# Patient Record
Sex: FEMALE | Race: WHITE | NOT HISPANIC OR LATINO | URBAN - METROPOLITAN AREA
[De-identification: names, ages, dates, MRNs, and addresses within clinical notes are randomized per-mention and may not be internally consistent; named-entity substitution may affect disease eponyms.]

---

## 2017-12-18 ENCOUNTER — OUTPATIENT (OUTPATIENT)
Dept: OUTPATIENT SERVICES | Facility: HOSPITAL | Age: 77
LOS: 1 days | Discharge: HOME | End: 2017-12-18
Payer: MEDICARE

## 2017-12-18 VITALS
RESPIRATION RATE: 16 BRPM | WEIGHT: 158.51 LBS | OXYGEN SATURATION: 96 % | TEMPERATURE: 98 F | SYSTOLIC BLOOD PRESSURE: 156 MMHG | HEART RATE: 91 BPM | DIASTOLIC BLOOD PRESSURE: 80 MMHG | HEIGHT: 58 IN

## 2017-12-18 DIAGNOSIS — Z01.818 ENCOUNTER FOR OTHER PREPROCEDURAL EXAMINATION: ICD-10-CM

## 2017-12-18 DIAGNOSIS — R94.31 ABNORMAL ELECTROCARDIOGRAM [ECG] [EKG]: ICD-10-CM

## 2017-12-18 DIAGNOSIS — Z90.89 ACQUIRED ABSENCE OF OTHER ORGANS: Chronic | ICD-10-CM

## 2017-12-18 DIAGNOSIS — Z90.11 ACQUIRED ABSENCE OF RIGHT BREAST AND NIPPLE: Chronic | ICD-10-CM

## 2017-12-18 DIAGNOSIS — M19.012 PRIMARY OSTEOARTHRITIS, LEFT SHOULDER: ICD-10-CM

## 2017-12-18 DIAGNOSIS — Z90.49 ACQUIRED ABSENCE OF OTHER SPECIFIED PARTS OF DIGESTIVE TRACT: Chronic | ICD-10-CM

## 2017-12-18 DIAGNOSIS — I10 ESSENTIAL (PRIMARY) HYPERTENSION: ICD-10-CM

## 2017-12-18 LAB
ANION GAP SERPL CALC-SCNC: 7 MMOL/L — SIGNIFICANT CHANGE UP (ref 5–17)
APTT BLD: 34.2 SEC — SIGNIFICANT CHANGE UP (ref 27.5–37.4)
BASOPHILS # BLD AUTO: 0.1 K/UL — SIGNIFICANT CHANGE UP (ref 0–0.2)
BASOPHILS NFR BLD AUTO: 1.3 % — SIGNIFICANT CHANGE UP (ref 0–2)
BUN SERPL-MCNC: 26 MG/DL — HIGH (ref 7–23)
CALCIUM SERPL-MCNC: 8.9 MG/DL — SIGNIFICANT CHANGE UP (ref 8.5–10.1)
CHLORIDE SERPL-SCNC: 105 MMOL/L — SIGNIFICANT CHANGE UP (ref 96–108)
CO2 SERPL-SCNC: 30 MMOL/L — SIGNIFICANT CHANGE UP (ref 22–31)
CREAT SERPL-MCNC: 0.79 MG/DL — SIGNIFICANT CHANGE UP (ref 0.5–1.3)
EOSINOPHIL # BLD AUTO: 0.1 K/UL — SIGNIFICANT CHANGE UP (ref 0–0.5)
EOSINOPHIL NFR BLD AUTO: 1.6 % — SIGNIFICANT CHANGE UP (ref 0–6)
GLUCOSE SERPL-MCNC: 92 MG/DL — SIGNIFICANT CHANGE UP (ref 70–99)
HBA1C BLD-MCNC: 5.8 % — HIGH (ref 4–5.6)
HCT VFR BLD CALC: 36.5 % — SIGNIFICANT CHANGE UP (ref 34.5–45)
HGB BLD-MCNC: 12.3 G/DL — SIGNIFICANT CHANGE UP (ref 11.5–15.5)
INR BLD: 1.15 RATIO — SIGNIFICANT CHANGE UP (ref 0.88–1.16)
LYMPHOCYTES # BLD AUTO: 2.2 K/UL — SIGNIFICANT CHANGE UP (ref 1–3.3)
LYMPHOCYTES # BLD AUTO: 27.6 % — SIGNIFICANT CHANGE UP (ref 13–44)
MCHC RBC-ENTMCNC: 31 PG — SIGNIFICANT CHANGE UP (ref 27–34)
MCHC RBC-ENTMCNC: 33.6 GM/DL — SIGNIFICANT CHANGE UP (ref 32–36)
MCV RBC AUTO: 92.3 FL — SIGNIFICANT CHANGE UP (ref 80–100)
MONOCYTES # BLD AUTO: 0.5 K/UL — SIGNIFICANT CHANGE UP (ref 0–0.9)
MONOCYTES NFR BLD AUTO: 6.4 % — SIGNIFICANT CHANGE UP (ref 2–14)
MRSA PCR RESULT.: SIGNIFICANT CHANGE UP
NEUTROPHILS # BLD AUTO: 4.9 K/UL — SIGNIFICANT CHANGE UP (ref 1.8–7.4)
NEUTROPHILS NFR BLD AUTO: 63.1 % — SIGNIFICANT CHANGE UP (ref 43–77)
PLATELET # BLD AUTO: 274 K/UL — SIGNIFICANT CHANGE UP (ref 150–400)
POTASSIUM SERPL-MCNC: 3.8 MMOL/L — SIGNIFICANT CHANGE UP (ref 3.5–5.3)
POTASSIUM SERPL-SCNC: 3.8 MMOL/L — SIGNIFICANT CHANGE UP (ref 3.5–5.3)
PROTHROM AB SERPL-ACNC: 12.6 SEC — SIGNIFICANT CHANGE UP (ref 9.8–12.7)
RBC # BLD: 3.96 M/UL — SIGNIFICANT CHANGE UP (ref 3.8–5.2)
RBC # FLD: 11.7 % — SIGNIFICANT CHANGE UP (ref 11–15)
S AUREUS DNA NOSE QL NAA+PROBE: SIGNIFICANT CHANGE UP
SODIUM SERPL-SCNC: 142 MMOL/L — SIGNIFICANT CHANGE UP (ref 135–145)
WBC # BLD: 7.8 K/UL — SIGNIFICANT CHANGE UP (ref 3.8–10.5)
WBC # FLD AUTO: 7.8 K/UL — SIGNIFICANT CHANGE UP (ref 3.8–10.5)

## 2017-12-18 PROCEDURE — 93010 ELECTROCARDIOGRAM REPORT: CPT | Mod: NC

## 2017-12-18 NOTE — H&P PST ADULT - NEGATIVE MUSCULOSKELETAL SYMPTOMS
no joint swelling/no myalgia/no back pain/no leg pain L/no muscle cramps/no stiffness/no arm pain R/no muscle weakness/no neck pain/no leg pain R

## 2017-12-18 NOTE — H&P PST ADULT - LAB RESULTS AND INTERPRETATION
CBC with diff, BMP, HGBA1C, T&S, PT/PTT ordered CBC with diff, BMP, HGBA1C, T&S, PT/PTT, MRSA nose culture ordered

## 2017-12-18 NOTE — H&P PST ADULT - NSANTHSNORERD_ENT_A_CORE
Bartolo Rincon is a 47year old female. CC:  Patient presents with:  Physical: annual physical      HPI:  Physical     Urinary frequency. No dysuria. Has some abdominal pain - no fevers.   Denies foul urine odor - states urine at time is \"white,\" any unusual skin lesions or rashes  EYES: no visual complaints or deficits  HEENT: denies nasal congestion, sinus pain or sore throat   RESPIRATORY: denies shortness of breath, wheezing or cough   CARDIOVASCULAR: denies chest pain,  edema, orthopnea, no pa metabolic and immunity disorder  - Comp Metabolic Panel (14) [E]; Future  - Lipid Panel [E]; Future  - Vitamin D, 25-Hydroxy [E]; Future  - TSH W Reflex To Free T4 [E]; Future  - CBC, Platelet, No Differential [E]; Future    3.  Screening for breast cancer No

## 2017-12-18 NOTE — H&P PST ADULT - CONSTITUTIONAL DETAILS
well-developed/well-groomed/well-nourished/obese/no distress well-developed/no distress/well-nourished/well-groomed

## 2017-12-18 NOTE — H&P PST ADULT - HISTORY OF PRESENT ILLNESS
76yo female with medical h/o HTN and prior h/o Breast cancer, right and had Lumpectomy 2/2013 and radiation. Pt c/o pain to left shoulder x 1 year, denies trauma. pt presents today for presurgical evaluation for Left Total Shoulder Arthroplasty with Augmented Glenoid scheduled for 12/28/2017.

## 2017-12-18 NOTE — H&P PST ADULT - MUSCULOSKELETAL
detailed exam no joint warmth/no joint erythema/no joint swelling/decreased ROM due to pain/normal strength/no calf tenderness details…

## 2017-12-18 NOTE — H&P PST ADULT - NEGATIVE BREAST SYMPTOMS
no breast tenderness R/no nipple discharge L/no breast tenderness L/no breast lump R/no nipple discharge R/no breast lump L

## 2017-12-18 NOTE — H&P PST ADULT - LYMPHATIC
posterior cervical R/supraclavicular R/anterior cervical R/posterior cervical L/supraclavicular L/anterior cervical L

## 2017-12-18 NOTE — H&P PST ADULT - PROBLEM SELECTOR PLAN 1
Left Total Shoulder Arthroplasty with Augmented Glenoid scheduled for 12/28/2017. Left Total Shoulder Arthroplasty with Augmented Glenoid scheduled for 12/28/2017.  Pre-op instructions given by RN. Pt verbalized understanding.  Chlorhexidine wash instructions given.  Medical clearance/evaluation requested - including copy of Stress/Echo if available.

## 2017-12-18 NOTE — H&P PST ADULT - FAMILY HISTORY
Sibling  Still living? No  Diabetes mellitus, Age at diagnosis: Age Unknown  Family history of breast cancer in sister, Age at diagnosis: Age Unknown

## 2017-12-18 NOTE — ASU PATIENT PROFILE, ADULT - PSH
History of lumpectomy of right breast  2003 - radiation also  S/P appendectomy  At Childhood  S/P laparoscopic cholecystectomy    S/P tonsillectomy  At Childhood

## 2017-12-18 NOTE — H&P PST ADULT - PROBLEM SELECTOR PLAN 3
Comparison EKG requested, spoke to Jennifer from PCP's office - no comparison EKG available. Recent Echo & Carotid doppler received.  Medical evaluation/clearance requested - pt has appt with PCP.

## 2017-12-18 NOTE — H&P PST ADULT - NSANTHOSAYNRD_GEN_A_CORE
No. ZAHRAA screening performed.  STOP BANG Legend: 0-2 = LOW Risk; 3-4 = INTERMEDIATE Risk; 5-8 = HIGH Risk

## 2017-12-18 NOTE — H&P PST ADULT - NEGATIVE CARDIOVASCULAR SYMPTOMS
no paroxysmal nocturnal dyspnea/no orthopnea/no peripheral edema/no chest pain/no dyspnea on exertion/no claudication/no palpitations

## 2017-12-27 ENCOUNTER — TRANSCRIPTION ENCOUNTER (OUTPATIENT)
Age: 77
End: 2017-12-27

## 2017-12-28 ENCOUNTER — TRANSCRIPTION ENCOUNTER (OUTPATIENT)
Age: 77
End: 2017-12-28

## 2017-12-28 ENCOUNTER — INPATIENT (INPATIENT)
Facility: HOSPITAL | Age: 77
LOS: 0 days | Discharge: HOME HEALTH | End: 2017-12-29
Attending: ORTHOPAEDIC SURGERY | Admitting: ORTHOPAEDIC SURGERY
Payer: MEDICARE

## 2017-12-28 ENCOUNTER — RESULT REVIEW (OUTPATIENT)
Age: 77
End: 2017-12-28

## 2017-12-28 VITALS
HEIGHT: 55 IN | TEMPERATURE: 97 F | OXYGEN SATURATION: 98 % | RESPIRATION RATE: 16 BRPM | DIASTOLIC BLOOD PRESSURE: 82 MMHG | SYSTOLIC BLOOD PRESSURE: 178 MMHG | WEIGHT: 156.97 LBS | HEART RATE: 91 BPM

## 2017-12-28 DIAGNOSIS — M19.012 PRIMARY OSTEOARTHRITIS, LEFT SHOULDER: ICD-10-CM

## 2017-12-28 DIAGNOSIS — I10 ESSENTIAL (PRIMARY) HYPERTENSION: ICD-10-CM

## 2017-12-28 DIAGNOSIS — Z90.49 ACQUIRED ABSENCE OF OTHER SPECIFIED PARTS OF DIGESTIVE TRACT: Chronic | ICD-10-CM

## 2017-12-28 DIAGNOSIS — Z90.11 ACQUIRED ABSENCE OF RIGHT BREAST AND NIPPLE: Chronic | ICD-10-CM

## 2017-12-28 DIAGNOSIS — Z90.89 ACQUIRED ABSENCE OF OTHER ORGANS: Chronic | ICD-10-CM

## 2017-12-28 DIAGNOSIS — C50.919 MALIGNANT NEOPLASM OF UNSPECIFIED SITE OF UNSPECIFIED FEMALE BREAST: ICD-10-CM

## 2017-12-28 LAB
ANION GAP SERPL CALC-SCNC: 9 MMOL/L — SIGNIFICANT CHANGE UP (ref 5–17)
BASOPHILS # BLD AUTO: 0.1 K/UL — SIGNIFICANT CHANGE UP (ref 0–0.2)
BASOPHILS NFR BLD AUTO: 0.7 % — SIGNIFICANT CHANGE UP (ref 0–2)
BUN SERPL-MCNC: 20 MG/DL — SIGNIFICANT CHANGE UP (ref 7–23)
CALCIUM SERPL-MCNC: 8.2 MG/DL — LOW (ref 8.5–10.1)
CHLORIDE SERPL-SCNC: 108 MMOL/L — SIGNIFICANT CHANGE UP (ref 96–108)
CO2 SERPL-SCNC: 24 MMOL/L — SIGNIFICANT CHANGE UP (ref 22–31)
CREAT SERPL-MCNC: 0.8 MG/DL — SIGNIFICANT CHANGE UP (ref 0.5–1.3)
EOSINOPHIL # BLD AUTO: 0 K/UL — SIGNIFICANT CHANGE UP (ref 0–0.5)
EOSINOPHIL NFR BLD AUTO: 0 % — SIGNIFICANT CHANGE UP (ref 0–6)
GLUCOSE SERPL-MCNC: 143 MG/DL — HIGH (ref 70–99)
HCT VFR BLD CALC: 34.3 % — LOW (ref 34.5–45)
HGB BLD-MCNC: 11.8 G/DL — SIGNIFICANT CHANGE UP (ref 11.5–15.5)
LYMPHOCYTES # BLD AUTO: 0.9 K/UL — LOW (ref 1–3.3)
LYMPHOCYTES # BLD AUTO: 5.8 % — LOW (ref 13–44)
MCHC RBC-ENTMCNC: 31.8 PG — SIGNIFICANT CHANGE UP (ref 27–34)
MCHC RBC-ENTMCNC: 34.3 GM/DL — SIGNIFICANT CHANGE UP (ref 32–36)
MCV RBC AUTO: 92.8 FL — SIGNIFICANT CHANGE UP (ref 80–100)
MONOCYTES # BLD AUTO: 0.4 K/UL — SIGNIFICANT CHANGE UP (ref 0–0.9)
MONOCYTES NFR BLD AUTO: 2.9 % — SIGNIFICANT CHANGE UP (ref 2–14)
NEUTROPHILS # BLD AUTO: 13.7 K/UL — HIGH (ref 1.8–7.4)
NEUTROPHILS NFR BLD AUTO: 90.7 % — HIGH (ref 43–77)
PLATELET # BLD AUTO: 271 K/UL — SIGNIFICANT CHANGE UP (ref 150–400)
POTASSIUM SERPL-MCNC: 4.3 MMOL/L — SIGNIFICANT CHANGE UP (ref 3.5–5.3)
POTASSIUM SERPL-SCNC: 4.3 MMOL/L — SIGNIFICANT CHANGE UP (ref 3.5–5.3)
RBC # BLD: 3.69 M/UL — LOW (ref 3.8–5.2)
RBC # FLD: 11.8 % — SIGNIFICANT CHANGE UP (ref 11–15)
SODIUM SERPL-SCNC: 141 MMOL/L — SIGNIFICANT CHANGE UP (ref 135–145)
WBC # BLD: 15.1 K/UL — HIGH (ref 3.8–10.5)
WBC # FLD AUTO: 15.1 K/UL — HIGH (ref 3.8–10.5)

## 2017-12-28 PROCEDURE — 88309 TISSUE EXAM BY PATHOLOGIST: CPT | Mod: 26

## 2017-12-28 PROCEDURE — 99233 SBSQ HOSP IP/OBS HIGH 50: CPT

## 2017-12-28 PROCEDURE — 73030 X-RAY EXAM OF SHOULDER: CPT | Mod: 26,LT

## 2017-12-28 PROCEDURE — 88311 DECALCIFY TISSUE: CPT | Mod: 26

## 2017-12-28 PROCEDURE — 88305 TISSUE EXAM BY PATHOLOGIST: CPT | Mod: 26

## 2017-12-28 RX ORDER — ACETAMINOPHEN 500 MG
1000 TABLET ORAL ONCE
Qty: 0 | Refills: 0 | Status: COMPLETED | OUTPATIENT
Start: 2017-12-28 | End: 2017-12-28

## 2017-12-28 RX ORDER — OXYCODONE HYDROCHLORIDE 5 MG/1
5 TABLET ORAL EVERY 4 HOURS
Qty: 0 | Refills: 0 | Status: DISCONTINUED | OUTPATIENT
Start: 2017-12-28 | End: 2017-12-29

## 2017-12-28 RX ORDER — SODIUM CHLORIDE 9 MG/ML
1000 INJECTION INTRAMUSCULAR; INTRAVENOUS; SUBCUTANEOUS
Qty: 0 | Refills: 0 | Status: DISCONTINUED | OUTPATIENT
Start: 2017-12-28 | End: 2017-12-29

## 2017-12-28 RX ORDER — CEFAZOLIN SODIUM 1 G
2000 VIAL (EA) INJECTION EVERY 6 HOURS
Qty: 0 | Refills: 0 | Status: COMPLETED | OUTPATIENT
Start: 2017-12-28 | End: 2017-12-29

## 2017-12-28 RX ORDER — SODIUM CHLORIDE 9 MG/ML
1000 INJECTION, SOLUTION INTRAVENOUS
Qty: 0 | Refills: 0 | Status: DISCONTINUED | OUTPATIENT
Start: 2017-12-28 | End: 2017-12-28

## 2017-12-28 RX ORDER — LOSARTAN POTASSIUM 100 MG/1
50 TABLET, FILM COATED ORAL DAILY
Qty: 0 | Refills: 0 | Status: DISCONTINUED | OUTPATIENT
Start: 2017-12-28 | End: 2017-12-29

## 2017-12-28 RX ORDER — DOCUSATE SODIUM 100 MG
100 CAPSULE ORAL THREE TIMES A DAY
Qty: 0 | Refills: 0 | Status: DISCONTINUED | OUTPATIENT
Start: 2017-12-28 | End: 2017-12-29

## 2017-12-28 RX ORDER — FENTANYL CITRATE 50 UG/ML
25 INJECTION INTRAVENOUS
Qty: 0 | Refills: 0 | Status: DISCONTINUED | OUTPATIENT
Start: 2017-12-28 | End: 2017-12-28

## 2017-12-28 RX ORDER — ASPIRIN/CALCIUM CARB/MAGNESIUM 324 MG
325 TABLET ORAL DAILY
Qty: 0 | Refills: 0 | Status: DISCONTINUED | OUTPATIENT
Start: 2017-12-28 | End: 2017-12-29

## 2017-12-28 RX ORDER — ASPIRIN/CALCIUM CARB/MAGNESIUM 324 MG
81 TABLET ORAL
Qty: 0 | Refills: 0 | COMMUNITY

## 2017-12-28 RX ORDER — HYDROCHLOROTHIAZIDE 25 MG
25 TABLET ORAL DAILY
Qty: 0 | Refills: 0 | Status: DISCONTINUED | OUTPATIENT
Start: 2017-12-28 | End: 2017-12-29

## 2017-12-28 RX ORDER — DIPHENHYDRAMINE HCL 50 MG
25 CAPSULE ORAL AT BEDTIME
Qty: 0 | Refills: 0 | Status: DISCONTINUED | OUTPATIENT
Start: 2017-12-28 | End: 2017-12-29

## 2017-12-28 RX ORDER — ONDANSETRON 8 MG/1
4 TABLET, FILM COATED ORAL ONCE
Qty: 0 | Refills: 0 | Status: DISCONTINUED | OUTPATIENT
Start: 2017-12-28 | End: 2017-12-28

## 2017-12-28 RX ORDER — INFLUENZA VIRUS VACCINE 15; 15; 15; 15 UG/.5ML; UG/.5ML; UG/.5ML; UG/.5ML
0.5 SUSPENSION INTRAMUSCULAR ONCE
Qty: 0 | Refills: 0 | Status: DISCONTINUED | OUTPATIENT
Start: 2017-12-28 | End: 2017-12-29

## 2017-12-28 RX ORDER — OXYCODONE HYDROCHLORIDE 5 MG/1
10 TABLET ORAL EVERY 12 HOURS
Qty: 0 | Refills: 0 | Status: DISCONTINUED | OUTPATIENT
Start: 2017-12-28 | End: 2017-12-29

## 2017-12-28 RX ORDER — IBUPROFEN 200 MG
1 TABLET ORAL
Qty: 0 | Refills: 0 | COMMUNITY

## 2017-12-28 RX ORDER — FENTANYL CITRATE 50 UG/ML
50 INJECTION INTRAVENOUS
Qty: 0 | Refills: 0 | Status: DISCONTINUED | OUTPATIENT
Start: 2017-12-28 | End: 2017-12-28

## 2017-12-28 RX ORDER — OXYCODONE HYDROCHLORIDE 5 MG/1
10 TABLET ORAL EVERY 4 HOURS
Qty: 0 | Refills: 0 | Status: DISCONTINUED | OUTPATIENT
Start: 2017-12-28 | End: 2017-12-29

## 2017-12-28 RX ADMIN — SODIUM CHLORIDE 75 MILLILITER(S): 9 INJECTION INTRAMUSCULAR; INTRAVENOUS; SUBCUTANEOUS at 21:26

## 2017-12-28 RX ADMIN — Medication 1000 MILLIGRAM(S): at 17:12

## 2017-12-28 RX ADMIN — Medication 400 MILLIGRAM(S): at 16:37

## 2017-12-28 RX ADMIN — SODIUM CHLORIDE 100 MILLILITER(S): 9 INJECTION, SOLUTION INTRAVENOUS at 16:38

## 2017-12-28 RX ADMIN — Medication 100 MILLIGRAM(S): at 21:26

## 2017-12-28 NOTE — PROGRESS NOTE ADULT - SUBJECTIVE AND OBJECTIVE BOX
HPI:  78 yo f with HTN, right sided breast cancer (s/p lumpectomy, radiation, sentinal node dissection in 2013), OA s/p left total shoulder arthroplasty and augmented glenoid. Pt. seen in PACU , feels ok, no shoulder pain, numbness and tingling in fingers, no n/v, no cp or sob.       PAST MEDICAL & SURGICAL HISTORY:  Primary osteoarthritis of left shoulder  Breast cancer: right 2003  Hypertension  S/P laparoscopic cholecystectomy  S/P tonsillectomy: At Childhood  S/P appendectomy: At Childhood  History of lumpectomy of right breast: 2003 - radiation also      REVIEW OF SYSTEMS:    CONSTITUTIONAL: No fever, weight loss, or fatigue  EYES: No eye pain, visual disturbances, or discharge  RESPIRATORY: No cough, wheezing, chills or hemoptysis; No shortness of breath  CARDIOVASCULAR: No chest pain, palpitations, dizziness, or leg swelling  GASTROINTESTINAL: No abdominal or epigastric pain. No nausea, vomiting, or hematemesis; No diarrhea or constipation. No melena or hematochezia.  GENITOURINARY: No dysuria, frequency, hematuria, or incontinence  NEUROLOGICAL: No headaches, memory loss, loss of strength, numbness, or tremors  MUSCULOSKELETAL: No joint pain or swelling; No muscle, back, or extremity pain        MEDICATIONS  (STANDING):  aspirin 325 milliGRAM(s) Oral daily  ceFAZolin   IVPB 2000 milliGRAM(s) IV Intermittent every 6 hours  docusate sodium 100 milliGRAM(s) Oral three times a day  influenza   Vaccine 0.5 milliLiter(s) IntraMuscular once  multivitamin 1 Tablet(s) Oral daily  sodium chloride 0.9%. 1000 milliLiter(s) (75 mL/Hr) IV Continuous <Continuous>    MEDICATIONS  (PRN):  diphenhydrAMINE   Capsule 25 milliGRAM(s) Oral at bedtime PRN Insomnia  oxyCODONE    IR 10 milliGRAM(s) Oral every 4 hours PRN Moderate Pain  oxyCODONE    IR 5 milliGRAM(s) Oral every 4 hours PRN Mild Pain  oxyCODONE  ER Tablet 10 milliGRAM(s) Oral every 12 hours PRN Severe Pain      Allergies    amoxicillin (Hives)  penicillin (Hives)    Intolerances        SOCIAL HISTORY:  retired, , lives with son  social EtOH, no smoking    FAMILY HISTORY:  Family history of breast cancer in sister (Sibling)  Diabetes mellitus (Sibling): brother      Vital Signs Last 24 Hrs  T(C): 36.7 (28 Dec 2017 18:02), Max: 36.7 (28 Dec 2017 17:36)  T(F): 98.1 (28 Dec 2017 18:02), Max: 98.1 (28 Dec 2017 17:36)  HR: 90 (28 Dec 2017 18:02) (72 - 91)  BP: 126/81 (28 Dec 2017 18:02) (126/81 - 187/89)  BP(mean): --  RR: 18 (28 Dec 2017 18:02) (13 - 20)  SpO2: 94% (28 Dec 2017 18:02) (94% - 98%)    PHYSICAL EXAM:    GENERAL: NAD,   HEAD:  Atraumatic, Normocephalic  EYES: EOMI, PERRLA, conjunctiva and sclera clear  ENMT: No tonsillar erythema, exudates, or enlargement; Moist mucous membranes, Good dentition, No lesions  NERVOUS SYSTEM:  Alert & Oriented X3, Good concentration; Motor Strength 5/5 B/L upper and lower extremities; DTRs 2+ intact and symmetric  CHEST/LUNG: Clear to percussion bilaterally; No rales, rhonchi, wheezing, or rubs  HEART: Regular rate and rhythm; No murmurs, rubs, or gallops  ABDOMEN: Soft, Nontender, Nondistended; Bowel sounds present  EXTREMITIES:  2+ Peripheral Pulses, No clubbing, cyanosis, or edema, left arm dressing c/d/i        LABS:                RADIOLOGY & ADDITIONAL STUDIES:

## 2017-12-28 NOTE — DISCHARGE NOTE ADULT - HOSPITAL COURSE
77F with primary osteoarthritis of Left shoulder admitted s/p Left Reverse Total shoulder arthroplasty. Patient tolerated the surgery well with no intraoperative complications. Patient was transferred to PACU and then to the floors in stable condition. Patient did well postoperatively, tolerated advancing diet with return of bowel function, pain was adequately controlled, and patient was voiding and ambulatory. Patient was discharged in stable condition.

## 2017-12-28 NOTE — DISCHARGE NOTE ADULT - NSFTFSERV1RD_GEN_ALL_CORE
observation and assessment/teaching and training observation and assessment/teaching and training/rehabilitation services

## 2017-12-28 NOTE — DISCHARGE NOTE ADULT - MEDICATION SUMMARY - MEDICATIONS TO TAKE
I will START or STAY ON the medications listed below when I get home from the hospital:    Turmeric  -- 1 tab(s) by mouth once a day  -- Indication: For supplement    Probiotic  -- 1 tab(s) by mouth once a day  -- Indication: For supplement    Aspirin Enteric Coated 325 mg oral delayed release tablet  -- 1 tab(s) by mouth once a day   -- Swallow whole.  Do not crush.  Take with food or milk.    -- Indication: For DVT prophylaxis    oxyCODONE-acetaminophen 5 mg-325 mg oral tablet  -- 1 tab(s) by mouth every 6 hours, As Needed -for moderate pain MDD:4   -- Caution federal law prohibits the transfer of this drug to any person other  than the person for whom it was prescribed.  May cause drowsiness.  Alcohol may intensify this effect.  Use care when operating dangerous machinery.  This prescription cannot be refilled.  This product contains acetaminophen.  Do not use  with any other product containing acetaminophen to prevent possible liver damage.  Using more of this medication than prescribed may cause serious breathing problems.    -- Indication: For Pain    losartan 50 mg oral tablet  -- 1 tab(s) by mouth once a day  -- Indication: For HTN    hydroCHLOROthiazide 25 mg oral tablet  -- 1 tab(s) by mouth once a day  -- Indication: For HTN    Colace 100 mg oral capsule  -- 1 cap(s) by mouth 2 times a day   -- Indication: For Constipation    Glucosamine & Chondroitin with MSM oral tablet  -- 1 tab(s) by mouth once a day  -- Indication: For supplement    Cod Liver Oil  -- 1 dose(s) by mouth once a day  -- Indication: For supplement    Centrum Silver Women's oral tablet  -- 1 tab(s) by mouth once a day  -- Indication: For supplement    biotin  -- 1 tab(s) by mouth once a day  -- Indication: For supplement

## 2017-12-28 NOTE — DISCHARGE NOTE ADULT - NS AS ACTIVITY OBS
Walking-Indoors allowed/No Heavy lifting/straining/Walking-Outdoors allowed/Do not make important decisions/Stairs allowed

## 2017-12-28 NOTE — DISCHARGE NOTE ADULT - PATIENT PORTAL LINK FT
“You can access the Follow[employer] Patient Portal, offered by [facility], by registering with the following website: [email or web address]”

## 2017-12-28 NOTE — PROGRESS NOTE ADULT - ASSESSMENT
78 yo f with HTN, right sided breast cancer (s/p lumpectomy, radiation, sentinal node dissection in 2013), OA s/p left total shoulder arthroplasty and augmented glenoid. (POD 0)

## 2017-12-28 NOTE — DISCHARGE NOTE ADULT - PLAN OF CARE
Recover from surgery May use prescribed narcotic pain medication as instructed, do not drive while using prescribed pain meds. Continue to use stool softener while using pain meds to prevent constipation. Continue Aspirin 325 daily for a total of 2 weeks. Resume a regular, cardiac diet. Keep wound clean and dry, may shower after 5 days from surgery, allow water to run over area and pat dry, avoid scrubbing area. Continue to use sling, shoulder ROM precautions. Refer to postoperative instructions handout given. Please call the office to make an apt. for follow up with Dr. Forte in 2 weeks. Continue home meds, and follow up with PMD Follow up with PMD

## 2017-12-28 NOTE — DISCHARGE NOTE ADULT - ADDITIONAL INSTRUCTIONS
Please notify physician sooner or return to the ER with new or worsening symptoms, chest pain, shortness of breath, palpitations, abdominal pain, nausea/vomiting, fever>101, foul smelling drainage or discharge from incisions, excessive bleeding on dressing, new sensation of numbness/tingling to extremity, or other concerns/issues.

## 2017-12-28 NOTE — PHARMACY COMMUNICATION NOTE - COMMENTS
s/w jovi palma - aware of possible pcn allergy and ok to give ancef; jovi palma also only wants to give ancef 1gm at this time

## 2017-12-28 NOTE — DISCHARGE NOTE ADULT - CARE PLAN
Principal Discharge DX:	Primary osteoarthritis of left shoulder  Goal:	Recover from surgery  Instructions for follow-up, activity and diet:	May use prescribed narcotic pain medication as instructed, do not drive while using prescribed pain meds. Continue to use stool softener while using pain meds to prevent constipation. Continue Aspirin 325 daily for a total of 2 weeks. Resume a regular, cardiac diet. Keep wound clean and dry, may shower after 5 days from surgery, allow water to run over area and pat dry, avoid scrubbing area. Continue to use sling, shoulder ROM precautions. Refer to postoperative instructions handout given. Please call the office to make an apt. for follow up with Dr. Forte in 2 weeks.  Secondary Diagnosis:	Essential hypertension  Instructions for follow-up, activity and diet:	Continue home meds, and follow up with PMD  Secondary Diagnosis:	Breast cancer  Instructions for follow-up, activity and diet:	Follow up with PMD

## 2017-12-28 NOTE — DISCHARGE NOTE ADULT - CARE PROVIDER_API CALL
Doe Forte), Orthopaedic Surgery  [address] [place] [address]  Phone: [phone]  Fax: [phone]

## 2017-12-28 NOTE — PROGRESS NOTE ADULT - SUBJECTIVE AND OBJECTIVE BOX
INTERVAL HPI/OVERNIGHT EVENTS:  Pt. seen and examined at bedside s/p Left reverse TSA. Patient seen resting comfortably in bed, offers no complaints at this time. Postop shoulder pain well controlled with medication. States she has some numbness/tingling to left hand/fingers. Tolerating diet, no N/V or abdominal pain. No flatus yet. Has not voided yet.   Denies fevers, cp, dyspnea, cough.     Vital Signs Last 24 Hrs  T(C): 36.7 (28 Dec 2017 20:00), Max: 37.1 (28 Dec 2017 19:02)  T(F): 98.1 (28 Dec 2017 20:00), Max: 98.8 (28 Dec 2017 19:02)  HR: 91 (28 Dec 2017 20:00) (72 - 92)  BP: 136/73 (28 Dec 2017 20:00) (126/81 - 187/89)  RR: 16 (28 Dec 2017 20:00) (13 - 20)  SpO2: 93% (28 Dec 2017 20:00) (93% - 98%)      PHYSICAL EXAM:    GENERAL: NAD  HEAD:  Atraumatic, Normocephalic  CHEST/LUNG: Clear to ausculation, bilaterally   HEART: S1S2, RRR  ABDOMEN: non distended, soft, non tender, no guarding  EXTREMITIES: Hemovac intact. Left shoulder dressing C/D/I. LUE sensation intact, moves fingers freely. 2+ peripheral pulses b/l. RUE NVI. Calf soft, nontender, IPCs in place b/l.      I&O's Detail    28 Dec 2017 07:01  -  28 Dec 2017 20:08  --------------------------------------------------------  IN:    Solution: 100 mL  Total IN: 100 mL    OUT:  Total OUT: 0 mL    Total NET: 100 mL    LABS:    12-28    141  |  108  |  20  ----------------------------<  143<H>  4.3   |  24  |  0.80    Ca    8.2<L>      28 Dec 2017 18:33    RADIOLOGY & ADDITIONAL STUDIES: Xray shoulder pending    Assessment: 77F s/p Left reverse TSA POD 0, postop stable.    Plan:  F/u Xray shoulder  Ancef x2 doses  Reg diet as tolerated   pain control prn  local wound care, keep arm in sling  hemovac-- monitor output, to be removed in AM  DVT ppx with ASA and IPCs  Incentive spirometer, OOB, ambulate as tolerated  F/u labs  Will discuss with surgical attending for further reccs INTERVAL HPI/OVERNIGHT EVENTS:  Pt. seen and examined at bedside s/p Left reverse TSA. Patient seen resting comfortably in bed, offers no complaints at this time. Postop shoulder pain well controlled with medication. States she has some numbness/tingling to left hand/fingers. Tolerating diet, no N/V or abdominal pain. No flatus yet. Has not voided yet.   Denies fevers, cp, dyspnea, cough.     Vital Signs Last 24 Hrs  T(C): 36.7 (28 Dec 2017 20:00), Max: 37.1 (28 Dec 2017 19:02)  T(F): 98.1 (28 Dec 2017 20:00), Max: 98.8 (28 Dec 2017 19:02)  HR: 91 (28 Dec 2017 20:00) (72 - 92)  BP: 136/73 (28 Dec 2017 20:00) (126/81 - 187/89)  RR: 16 (28 Dec 2017 20:00) (13 - 20)  SpO2: 93% (28 Dec 2017 20:00) (93% - 98%)      PHYSICAL EXAM:    GENERAL: NAD  HEAD:  Atraumatic, Normocephalic  CHEST/LUNG: Clear to ausculation, bilaterally   HEART: S1S2, RRR  ABDOMEN: non distended, soft, non tender, no guarding  EXTREMITIES: Hemovac intact. Left shoulder dressing C/D/I. LUE sensation intact, moves fingers freely. 2+ peripheral pulses b/l. RUE NVI. Calf soft, nontender, IPCs in place b/l.      I&O's Detail    28 Dec 2017 07:01  -  28 Dec 2017 20:08  --------------------------------------------------------  IN:    Solution: 100 mL  Total IN: 100 mL    OUT:  Total OUT: 0 mL    Total NET: 100 mL    LABS:    12-28    141  |  108  |  20  ----------------------------<  143<H>  4.3   |  24  |  0.80    Ca    8.2<L>      28 Dec 2017 18:33    RADIOLOGY & ADDITIONAL STUDIES: Xray shoulder pending    Assessment: 77F s/p Left reverse TSA POD 0, postop stable.    Plan:  F/u Xray shoulder  Ancef x2 doses  Reg diet as tolerated   pain control prn  local wound care, keep arm in sling  hemovac-- monitor output, to be removed in AM  DVT ppx with ASA and IPCs  Incentive spirometer, OOB, ambulate as tolerated  F/u labs  continue medical management and supportive care, home meds resumed  Will discuss with surgical attending for further reccs INTERVAL HPI/OVERNIGHT EVENTS:  Pt. seen and examined at bedside s/p Left reverse TSA. Patient seen resting comfortably in bed, offers no complaints at this time. Postop shoulder pain well controlled with medication. States she has some numbness/tingling to left hand/fingers. Tolerating diet, no N/V or abdominal pain. No flatus yet. Has not voided yet.   Denies fevers, cp, dyspnea, cough.     Vital Signs Last 24 Hrs  T(C): 36.7 (28 Dec 2017 20:00), Max: 37.1 (28 Dec 2017 19:02)  T(F): 98.1 (28 Dec 2017 20:00), Max: 98.8 (28 Dec 2017 19:02)  HR: 91 (28 Dec 2017 20:00) (72 - 92)  BP: 136/73 (28 Dec 2017 20:00) (126/81 - 187/89)  RR: 16 (28 Dec 2017 20:00) (13 - 20)  SpO2: 93% (28 Dec 2017 20:00) (93% - 98%)      PHYSICAL EXAM:    GENERAL: NAD  HEAD:  Atraumatic, Normocephalic  CHEST/LUNG: Clear to ausculation, bilaterally   HEART: S1S2, RRR  ABDOMEN: non distended, soft, non tender, no guarding  EXTREMITIES: Hemovac intact. Left shoulder dressing C/D/I. LUE sensation intact, moves fingers freely. 2+ peripheral pulses b/l. RUE NVI. Calf soft, nontender, IPCs in place b/l.      I&O's Detail    28 Dec 2017 07:01  -  28 Dec 2017 20:08  --------------------------------------------------------  IN:    Solution: 100 mL  Total IN: 100 mL    OUT:  Total OUT: 0 mL    Total NET: 100 mL    LABS:    12-28    141  |  108  |  20  ----------------------------<  143<H>  4.3   |  24  |  0.80    Ca    8.2<L>      28 Dec 2017 18:33    RADIOLOGY & ADDITIONAL STUDIES: Xray shoulder pending    Assessment: 77F s/p Left reverse TSA POD 0, postop stable.    Plan:  F/u Xray shoulder  Ancef x2 doses  Reg diet as tolerated   pain control prn  PT eval-- no shoulder ROM, elbow, wrist, hand rom only  local wound care, keep arm in sling  hemovac-- monitor output, to be removed in AM  DVT ppx with ASA and IPCs  Incentive spirometer, OOB, ambulate as tolerated  F/u labs  continue medical management and supportive care, home meds resumed  Will discuss with surgical attending for further reccs

## 2017-12-29 VITALS
TEMPERATURE: 99 F | HEART RATE: 74 BPM | OXYGEN SATURATION: 94 % | DIASTOLIC BLOOD PRESSURE: 69 MMHG | RESPIRATION RATE: 15 BRPM | SYSTOLIC BLOOD PRESSURE: 135 MMHG

## 2017-12-29 LAB
ANION GAP SERPL CALC-SCNC: 10 MMOL/L — SIGNIFICANT CHANGE UP (ref 5–17)
BUN SERPL-MCNC: 21 MG/DL — SIGNIFICANT CHANGE UP (ref 7–23)
CALCIUM SERPL-MCNC: 8.1 MG/DL — LOW (ref 8.5–10.1)
CHLORIDE SERPL-SCNC: 107 MMOL/L — SIGNIFICANT CHANGE UP (ref 96–108)
CO2 SERPL-SCNC: 24 MMOL/L — SIGNIFICANT CHANGE UP (ref 22–31)
CREAT SERPL-MCNC: 0.95 MG/DL — SIGNIFICANT CHANGE UP (ref 0.5–1.3)
GLUCOSE SERPL-MCNC: 110 MG/DL — HIGH (ref 70–99)
HCT VFR BLD CALC: 33.2 % — LOW (ref 34.5–45)
HGB BLD-MCNC: 11 G/DL — LOW (ref 11.5–15.5)
MCHC RBC-ENTMCNC: 30.6 PG — SIGNIFICANT CHANGE UP (ref 27–34)
MCHC RBC-ENTMCNC: 33.1 GM/DL — SIGNIFICANT CHANGE UP (ref 32–36)
MCV RBC AUTO: 92.6 FL — SIGNIFICANT CHANGE UP (ref 80–100)
PLATELET # BLD AUTO: 296 K/UL — SIGNIFICANT CHANGE UP (ref 150–400)
POTASSIUM SERPL-MCNC: 4.5 MMOL/L — SIGNIFICANT CHANGE UP (ref 3.5–5.3)
POTASSIUM SERPL-SCNC: 4.5 MMOL/L — SIGNIFICANT CHANGE UP (ref 3.5–5.3)
RBC # BLD: 3.59 M/UL — LOW (ref 3.8–5.2)
RBC # FLD: 12 % — SIGNIFICANT CHANGE UP (ref 11–15)
SODIUM SERPL-SCNC: 141 MMOL/L — SIGNIFICANT CHANGE UP (ref 135–145)
WBC # BLD: 10.9 K/UL — HIGH (ref 3.8–10.5)
WBC # FLD AUTO: 10.9 K/UL — HIGH (ref 3.8–10.5)

## 2017-12-29 PROCEDURE — 99233 SBSQ HOSP IP/OBS HIGH 50: CPT

## 2017-12-29 RX ORDER — ACETAMINOPHEN 500 MG
1000 TABLET ORAL ONCE
Qty: 0 | Refills: 0 | Status: COMPLETED | OUTPATIENT
Start: 2017-12-29 | End: 2017-12-29

## 2017-12-29 RX ORDER — DOCUSATE SODIUM 100 MG
1 CAPSULE ORAL
Qty: 20 | Refills: 0 | OUTPATIENT
Start: 2017-12-29 | End: 2018-01-07

## 2017-12-29 RX ORDER — ASPIRIN/CALCIUM CARB/MAGNESIUM 324 MG
1 TABLET ORAL
Qty: 14 | Refills: 0 | OUTPATIENT
Start: 2017-12-29 | End: 2018-01-11

## 2017-12-29 RX ADMIN — OXYCODONE HYDROCHLORIDE 10 MILLIGRAM(S): 5 TABLET ORAL at 00:25

## 2017-12-29 RX ADMIN — Medication 400 MILLIGRAM(S): at 07:46

## 2017-12-29 RX ADMIN — OXYCODONE HYDROCHLORIDE 10 MILLIGRAM(S): 5 TABLET ORAL at 01:25

## 2017-12-29 RX ADMIN — Medication 325 MILLIGRAM(S): at 12:05

## 2017-12-29 RX ADMIN — Medication 100 MILLIGRAM(S): at 15:01

## 2017-12-29 RX ADMIN — Medication 1000 MILLIGRAM(S): at 08:27

## 2017-12-29 RX ADMIN — OXYCODONE HYDROCHLORIDE 10 MILLIGRAM(S): 5 TABLET ORAL at 12:52

## 2017-12-29 RX ADMIN — OXYCODONE HYDROCHLORIDE 10 MILLIGRAM(S): 5 TABLET ORAL at 07:02

## 2017-12-29 RX ADMIN — Medication 100 MILLIGRAM(S): at 03:11

## 2017-12-29 RX ADMIN — OXYCODONE HYDROCHLORIDE 10 MILLIGRAM(S): 5 TABLET ORAL at 11:59

## 2017-12-29 RX ADMIN — LOSARTAN POTASSIUM 50 MILLIGRAM(S): 100 TABLET, FILM COATED ORAL at 05:27

## 2017-12-29 RX ADMIN — OXYCODONE HYDROCHLORIDE 10 MILLIGRAM(S): 5 TABLET ORAL at 06:03

## 2017-12-29 RX ADMIN — Medication 100 MILLIGRAM(S): at 05:27

## 2017-12-29 RX ADMIN — Medication 1 TABLET(S): at 11:59

## 2017-12-29 RX ADMIN — Medication 25 MILLIGRAM(S): at 05:27

## 2017-12-29 NOTE — PHYSICAL THERAPY INITIAL EVALUATION ADULT - PERTINENT HX OF CURRENT PROBLEM, REHAB EVAL
Pt is a 76yo F admitted for scheduled L reverse shoulder arthoplasty secondary to L shoulder OA. No post-op complications.

## 2017-12-29 NOTE — PHYSICAL THERAPY INITIAL EVALUATION ADULT - GAIT DEVIATIONS NOTED, PT EVAL
decreased richie/decreased weight-shifting ability/increased trunk flexion/decreased step length/decreased stride length

## 2017-12-29 NOTE — OCCUPATIONAL THERAPY INITIAL EVALUATION ADULT - GENERAL OBSERVATIONS, REHAB EVAL
Pt was encountered OOB in chair with son Jose M present; NAD, S/P L reverse total shoulder replacement POD 1, NWB LUE, SHARLA in sling, no shoulder ROM allowed, LUE shoulder dressing clean, dry and intact, AXOX4, cooperative, followed commands; pt c/o pain in L shoulder due to s/p L total shoulder replacement which impacts pt performance with functional ADL's/transfers and mobility.

## 2017-12-29 NOTE — OCCUPATIONAL THERAPY INITIAL EVALUATION ADULT - RANGE OF MOTION EXAMINATION, UPPER EXTREMITY
Right UE Passive ROM was WFL  (within functional limits)/Right UE Active ROM was WFL (within functional limits)/LUE AROM/PROM shoulder not allowed due to shoulder precautions, LUE Distal to shoulder  AROM/PROM WFL

## 2017-12-29 NOTE — PROGRESS NOTE ADULT - SUBJECTIVE AND OBJECTIVE BOX
Patient is a 77y old  Female who presents with a chief complaint of left shoulder surgery (28 Dec 2017 23:29)      INTERVAL HPI/OVERNIGHT EVENTS:  no events over night, starting to feel some pain in left shoulder but controlled with oral pain meds    MEDICATIONS  (STANDING):  aspirin 325 milliGRAM(s) Oral daily  docusate sodium 100 milliGRAM(s) Oral three times a day  hydrochlorothiazide 25 milliGRAM(s) Oral daily  influenza   Vaccine 0.5 milliLiter(s) IntraMuscular once  losartan 50 milliGRAM(s) Oral daily  multivitamin 1 Tablet(s) Oral daily  sodium chloride 0.9%. 1000 milliLiter(s) (75 mL/Hr) IV Continuous <Continuous>    MEDICATIONS  (PRN):  diphenhydrAMINE   Capsule 25 milliGRAM(s) Oral at bedtime PRN Insomnia  oxyCODONE    IR 10 milliGRAM(s) Oral every 4 hours PRN Moderate Pain  oxyCODONE    IR 5 milliGRAM(s) Oral every 4 hours PRN Mild Pain  oxyCODONE  ER Tablet 10 milliGRAM(s) Oral every 12 hours PRN Severe Pain      Allergies    amoxicillin (Hives)  penicillin (Hives)    Intolerances        REVIEW OF SYSTEMS:  CONSTITUTIONAL: No fever, weight loss, + fatigue  RESPIRATORY: No cough, wheezing, chills or hemoptysis; No shortness of breath  CARDIOVASCULAR: No chest pain, palpitations, dizziness, or leg swelling  GASTROINTESTINAL: No abdominal or epigastric pain. No nausea, vomiting, or hematemesis; No diarrhea or constipation. No melena or hematochezia.  NEUROLOGICAL: No headaches, memory loss, loss of strength, numbness, or tremors  SKIN: No itching, burning, rashes, or lesions   MUSCULOSKELETAL: No joint pain or swelling; No muscle, back, left shoulder pain at surgical site  PSYCHIATRIC: No depression, anxiety, mood swings, or difficulty sleeping  HEME/LYMPH: No easy bruising, or bleeding gums  ALLERGY AND IMMUNOLOGIC: No hives or eczema    Vital Signs Last 24 Hrs  T(C): 37.5 (29 Dec 2017 05:00), Max: 37.5 (29 Dec 2017 05:00)  T(F): 99.5 (29 Dec 2017 05:00), Max: 99.5 (29 Dec 2017 05:00)  HR: 85 (29 Dec 2017 05:00) (72 - 92)  BP: 101/55 (29 Dec 2017 05:00) (101/55 - 187/89)  BP(mean): --  RR: 15 (29 Dec 2017 05:00) (13 - 20)  SpO2: 95% (29 Dec 2017 05:00) (93% - 98%)    PHYSICAL EXAM:  GENERAL: NAD, well-groomed, well-developed  HEAD:  Atraumatic, Normocephalic  EYES: EOMI, PERRLA, conjunctiva and sclera clear  ENMT: No tonsillar erythema, exudates, or enlargement; Moist mucous membranes, Good dentition, No lesions  NECK: Supple, No JVD, Normal thyroid  NERVOUS SYSTEM:  Alert & Oriented X3, Good concentration; Motor Strength 5/5 B/L upper and lower extremities; DTRs 2+ intact and symmetric  CHEST/LUNG: Clear to percussion bilaterally; No rales, rhonchi, wheezing, or rubs  HEART: Regular rate and rhythm; No murmurs, rubs, or gallops  ABDOMEN: Soft, Nontender, Nondistended; Bowel sounds present  EXTREMITIES:  2+ Peripheral Pulses, No clubbing, cyanosis, or mild edema left shoulder dressing c/d/i, arm insling      LABS:                        11.0   10.9  )-----------( 296      ( 29 Dec 2017 06:21 )             33.2     12-29    141  |  107  |  21  ----------------------------<  110<H>  4.5   |  24  |  0.95    Ca    8.1<L>      29 Dec 2017 06:21          CAPILLARY BLOOD GLUCOSE          RADIOLOGY & ADDITIONAL TESTS:    Imaging Personally Reviewed:  [x ] YES  [ ] NO    Consultant(s) Notes Reviewed:  [ x] YES  [ ] NO    Care Discussed with Consultants/Other Providers [x ] YES  [ ] NO

## 2017-12-29 NOTE — PROGRESS NOTE ADULT - SUBJECTIVE AND OBJECTIVE BOX
INTERVAL HPI/OVERNIGHT EVENTS:  Pt. seen and examined at bedside. Pt. seen resting comfortably in bed, no acute overnight event. States pain is well controlled with medication. Tolerating diet, no n/v, or abdominal pain.  Voiding well.   Denies fever/chills, cp, dyspnea, cough, dysuria.     Vital Signs Last 24 Hrs  T(C): 37.3 (29 Dec 2017 01:02), Max: 37.3 (29 Dec 2017 01:02)  T(F): 99.2 (29 Dec 2017 01:02), Max: 99.2 (29 Dec 2017 01:02)  HR: 87 (29 Dec 2017 01:02) (72 - 92)  BP: 140/60 (29 Dec 2017 01:02) (126/81 - 187/89)  RR: 16 (29 Dec 2017 01:02) (13 - 20)  SpO2: 95% (29 Dec 2017 01:02) (93% - 98%)    PHYSICAL EXAM:    GENERAL: NAD, alert and awake  HEAD: Atraumatic, Normocephalic  CHEST/LUNG: Clear to ausculation, bilaterally   HEART: S1S2, RRR  ABDOMEN: non distended, soft, non tender, no guarding  EXTREMITIES: Hemovac intact. Left shoulder dressing C/D/I. LUE sensation intact, moves fingers freely. 2+ peripheral pulses b/l. RUE NVI. Calf soft, nontender, IPCs in place b/l.     I&O's Detail    28 Dec 2017 07:01  -  29 Dec 2017 05:49  --------------------------------------------------------  IN:    Solution: 100 mL  Total IN: 100 mL    OUT:    Voided: 600 mL  Total OUT: 600 mL    Total NET: -500 mL    LABS:                        11.8   15.1  )-----------( 271      ( 28 Dec 2017 20:39 )             34.3     12-28    141  |  108  |  20  ----------------------------<  143<H>  4.3   |  24  |  0.80    Ca    8.2<L>      28 Dec 2017 18:33    Assessment: 77F s/p Left reverse TSA POD #1, postop stable.  Leukocytosis-- likely reactive s/p surgery    Plan:  Reg diet as tolerated   pain control prn  PT eval-- no shoulder ROM, elbow, wrist, hand rom only  local wound care, keep arm in sling  hemovac-- monitor output-- to be removed  DVT ppx with ASA and IPCs  Incentive spirometer, OOB, ambulate as tolerated  F/u am labs  continue medical management and supportive care, home meds resumed  Will discuss with surgical attending for further reccs INTERVAL HPI/OVERNIGHT EVENTS:  Pt. seen and examined at bedside. Pt. seen resting comfortably in bed, no acute overnight event. States pain is well controlled with medication. Tolerating diet, no n/v, or abdominal pain. Was out of bed this morning, Voiding well. No flatus yet.  Denies fever/chills, cp, dyspnea, cough, dysuria.     Vital Signs Last 24 Hrs  T(C): 37.3 (29 Dec 2017 01:02), Max: 37.3 (29 Dec 2017 01:02)  T(F): 99.2 (29 Dec 2017 01:02), Max: 99.2 (29 Dec 2017 01:02)  HR: 87 (29 Dec 2017 01:02) (72 - 92)  BP: 140/60 (29 Dec 2017 01:02) (126/81 - 187/89)  RR: 16 (29 Dec 2017 01:02) (13 - 20)  SpO2: 95% (29 Dec 2017 01:02) (93% - 98%)    PHYSICAL EXAM:    GENERAL: NAD, alert and awake  HEAD: Atraumatic, Normocephalic  CHEST/LUNG: Clear to ausculation, bilaterally   HEART: S1S2, RRR  ABDOMEN: non distended, soft, non tender, no guarding  EXTREMITIES: Hemovac intact with minimal serosanguinous output. Left shoulder dressing C/D/I. LUE sensation intact, moves fingers freely. 2+ peripheral pulses b/l. RUE NVI. Calf soft, nontender, IPCs in place b/l.     I&O's Detail    28 Dec 2017 07:01  -  29 Dec 2017 05:49  --------------------------------------------------------  IN:    Solution: 100 mL  Total IN: 100 mL    OUT:    Voided: 600 mL  Total OUT: 600 mL    Total NET: -500 mL    LABS:                        11.8   15.1  )-----------( 271      ( 28 Dec 2017 20:39 )             34.3     12-28    141  |  108  |  20  ----------------------------<  143<H>  4.3   |  24  |  0.80    Ca    8.2<L>      28 Dec 2017 18:33    Assessment: 77F s/p Left reverse TSA POD #1, postop stable.  Leukocytosis-- likely reactive s/p surgery    Plan:  Reg diet as tolerated   pain control prn  PT eval-- no shoulder ROM, elbow, wrist, hand rom only  local wound care, keep arm in sling  Remove Hemovac  DVT ppx with ASA and IPCs  Incentive spirometer, OOB, ambulate as tolerated  F/u am labs  continue medical management and supportive care, home meds resumed  D/C planning  Will discuss with surgical attending for further reccs

## 2017-12-29 NOTE — PHYSICAL THERAPY INITIAL EVALUATION ADULT - GENERAL OBSERVATIONS, REHAB EVAL
Pt encountered supine in bed + dressing & sling donned to LUE (clean, dry, & intact), R IJ, pneumatic teds donned B/L, son at bedside

## 2017-12-29 NOTE — PROGRESS NOTE ADULT - ASSESSMENT
78 yo f with HTN, right sided breast cancer (s/p lumpectomy, radiation, sentinal node dissection in 2013), OA s/p left total shoulder arthroplasty and augmented glenoid. (POD 1)

## 2017-12-29 NOTE — PHYSICAL THERAPY INITIAL EVALUATION ADULT - PLANNED THERAPY INTERVENTIONS, PT EVAL
stair negotiation/balance training/gait training/strengthening/transfer training/bed mobility training

## 2017-12-29 NOTE — PHYSICAL THERAPY INITIAL EVALUATION ADULT - MODIFIED CLINICAL TEST OF SENSORY INTEGRATION IN BALANCE TEST
Barthel Index: Feeding Score _5/10__, Bathing Score _0/5__, Grooming Score _0/5__, Dressing Score _5/10__, Bowels Score __10/10_, Bladder Score _10/10__, Toilet Score _10/10__, Transfers Score _15/15__, Mobility Score __0/15_, Stairs Score _10/10__,     Total Score ___65/100

## 2017-12-29 NOTE — OCCUPATIONAL THERAPY INITIAL EVALUATION ADULT - ADDITIONAL COMMENTS
Patient lives in a private home with 6 steps to enter with railings on both sides. Once inside, the patients main bedroom and bathroom is on that level. The bathroom has a tub/shower combination with a comfort height toilet. The patient has grab bars installed outside of the tub/shower and inside of the tub/shower. The patient ambulates with no device and does not own any devices. The patient is R handed, wears glasses for long distance and reading and drives.

## 2017-12-29 NOTE — OCCUPATIONAL THERAPY INITIAL EVALUATION ADULT - MODIFIED CLINICAL TEST OF SENSORY INTEGRATION IN BALANCE TEST
Barthel Index: Feeding Score___5___, Bathing Score___0___, Grooming Score__0___, Dressing Score___0__, Bowel Score__10___, Bladder Score___10___, Toilet Score___5__, Transfer Score___15___, Mobility Score__15___, Stairs Score___10__, Total Score_70____.

## 2017-12-29 NOTE — PHYSICAL THERAPY INITIAL EVALUATION ADULT - ADDITIONAL COMMENTS
Pt lives with his son in a private home, 4 entry steps (+ railing), no steps inside to negotiate. Prior to admission pt was independent with all functional mobility including community ambulation without a device. Pt is right hand dominant, wears eye glasses for reading & distance. Goal of therapy: return home. Pt's son took off from work in order to provide assistance for all functional mobility upon discharge home.

## 2018-01-03 DIAGNOSIS — M19.012 PRIMARY OSTEOARTHRITIS, LEFT SHOULDER: ICD-10-CM

## 2018-01-03 DIAGNOSIS — Z85.3 PERSONAL HISTORY OF MALIGNANT NEOPLASM OF BREAST: ICD-10-CM

## 2018-01-03 DIAGNOSIS — Z88.0 ALLERGY STATUS TO PENICILLIN: ICD-10-CM

## 2018-01-03 DIAGNOSIS — M75.102 UNSPECIFIED ROTATOR CUFF TEAR OR RUPTURE OF LEFT SHOULDER, NOT SPECIFIED AS TRAUMATIC: ICD-10-CM

## 2018-01-03 DIAGNOSIS — I10 ESSENTIAL (PRIMARY) HYPERTENSION: ICD-10-CM

## 2018-01-03 DIAGNOSIS — Z79.82 LONG TERM (CURRENT) USE OF ASPIRIN: ICD-10-CM

## 2018-01-03 DIAGNOSIS — Z90.49 ACQUIRED ABSENCE OF OTHER SPECIFIED PARTS OF DIGESTIVE TRACT: ICD-10-CM

## 2018-01-03 DIAGNOSIS — Z92.3 PERSONAL HISTORY OF IRRADIATION: ICD-10-CM

## 2018-05-03 ENCOUNTER — OUTPATIENT (OUTPATIENT)
Dept: OUTPATIENT SERVICES | Facility: HOSPITAL | Age: 78
LOS: 1 days | Discharge: HOME | End: 2018-05-03
Payer: MEDICARE

## 2018-05-03 VITALS
SYSTOLIC BLOOD PRESSURE: 146 MMHG | WEIGHT: 155.43 LBS | DIASTOLIC BLOOD PRESSURE: 65 MMHG | HEART RATE: 92 BPM | HEIGHT: 58 IN | TEMPERATURE: 98 F | RESPIRATION RATE: 16 BRPM | OXYGEN SATURATION: 98 %

## 2018-05-03 DIAGNOSIS — Z98.890 OTHER SPECIFIED POSTPROCEDURAL STATES: Chronic | ICD-10-CM

## 2018-05-03 DIAGNOSIS — Z90.89 ACQUIRED ABSENCE OF OTHER ORGANS: Chronic | ICD-10-CM

## 2018-05-03 DIAGNOSIS — Z90.49 ACQUIRED ABSENCE OF OTHER SPECIFIED PARTS OF DIGESTIVE TRACT: Chronic | ICD-10-CM

## 2018-05-03 DIAGNOSIS — Z90.11 ACQUIRED ABSENCE OF RIGHT BREAST AND NIPPLE: Chronic | ICD-10-CM

## 2018-05-03 DIAGNOSIS — Z96.612 PRESENCE OF LEFT ARTIFICIAL SHOULDER JOINT: Chronic | ICD-10-CM

## 2018-05-03 DIAGNOSIS — Z01.818 ENCOUNTER FOR OTHER PREPROCEDURAL EXAMINATION: ICD-10-CM

## 2018-05-03 LAB
ANION GAP SERPL CALC-SCNC: 9 MMOL/L — SIGNIFICANT CHANGE UP (ref 5–17)
APTT BLD: 34.1 SEC — SIGNIFICANT CHANGE UP (ref 27.5–37.4)
BASOPHILS # BLD AUTO: 0.06 K/UL — SIGNIFICANT CHANGE UP (ref 0–0.2)
BASOPHILS NFR BLD AUTO: 0.7 % — SIGNIFICANT CHANGE UP (ref 0–2)
BUN SERPL-MCNC: 24 MG/DL — HIGH (ref 7–23)
CALCIUM SERPL-MCNC: 9.8 MG/DL — SIGNIFICANT CHANGE UP (ref 8.5–10.1)
CHLORIDE SERPL-SCNC: 104 MMOL/L — SIGNIFICANT CHANGE UP (ref 96–108)
CO2 SERPL-SCNC: 28 MMOL/L — SIGNIFICANT CHANGE UP (ref 22–31)
CREAT SERPL-MCNC: 0.7 MG/DL — SIGNIFICANT CHANGE UP (ref 0.5–1.3)
EOSINOPHIL # BLD AUTO: 0.12 K/UL — SIGNIFICANT CHANGE UP (ref 0–0.5)
EOSINOPHIL NFR BLD AUTO: 1.4 % — SIGNIFICANT CHANGE UP (ref 0–6)
GLUCOSE SERPL-MCNC: 94 MG/DL — SIGNIFICANT CHANGE UP (ref 70–99)
HBA1C BLD-MCNC: 6 % — HIGH (ref 4–5.6)
HCT VFR BLD CALC: 35 % — SIGNIFICANT CHANGE UP (ref 34.5–45)
HGB BLD-MCNC: 11 G/DL — LOW (ref 11.5–15.5)
IMM GRANULOCYTES NFR BLD AUTO: 0.4 % — SIGNIFICANT CHANGE UP (ref 0–1.5)
INR BLD: 1.21 RATIO — HIGH (ref 0.88–1.16)
LYMPHOCYTES # BLD AUTO: 1.98 K/UL — SIGNIFICANT CHANGE UP (ref 1–3.3)
LYMPHOCYTES # BLD AUTO: 23.8 % — SIGNIFICANT CHANGE UP (ref 13–44)
MCHC RBC-ENTMCNC: 27.1 PG — SIGNIFICANT CHANGE UP (ref 27–34)
MCHC RBC-ENTMCNC: 31.4 GM/DL — LOW (ref 32–36)
MCV RBC AUTO: 86.2 FL — SIGNIFICANT CHANGE UP (ref 80–100)
MONOCYTES # BLD AUTO: 0.66 K/UL — SIGNIFICANT CHANGE UP (ref 0–0.9)
MONOCYTES NFR BLD AUTO: 7.9 % — SIGNIFICANT CHANGE UP (ref 2–14)
MRSA PCR RESULT.: SIGNIFICANT CHANGE UP
NEUTROPHILS # BLD AUTO: 5.48 K/UL — SIGNIFICANT CHANGE UP (ref 1.8–7.4)
NEUTROPHILS NFR BLD AUTO: 65.8 % — SIGNIFICANT CHANGE UP (ref 43–77)
PLATELET # BLD AUTO: 313 K/UL — SIGNIFICANT CHANGE UP (ref 150–400)
POTASSIUM SERPL-MCNC: 3.5 MMOL/L — SIGNIFICANT CHANGE UP (ref 3.5–5.3)
POTASSIUM SERPL-SCNC: 3.5 MMOL/L — SIGNIFICANT CHANGE UP (ref 3.5–5.3)
PROTHROM AB SERPL-ACNC: 13.2 SEC — HIGH (ref 9.8–12.7)
RBC # BLD: 4.06 M/UL — SIGNIFICANT CHANGE UP (ref 3.8–5.2)
RBC # FLD: 16.6 % — HIGH (ref 10.3–14.5)
S AUREUS DNA NOSE QL NAA+PROBE: SIGNIFICANT CHANGE UP
SODIUM SERPL-SCNC: 141 MMOL/L — SIGNIFICANT CHANGE UP (ref 135–145)
WBC # BLD: 8.33 K/UL — SIGNIFICANT CHANGE UP (ref 3.8–10.5)
WBC # FLD AUTO: 8.33 K/UL — SIGNIFICANT CHANGE UP (ref 3.8–10.5)

## 2018-05-03 PROCEDURE — 93010 ELECTROCARDIOGRAM REPORT: CPT | Mod: NC

## 2018-05-03 NOTE — H&P PST ADULT - HISTORY OF PRESENT ILLNESS
76 yo female c/o right shoulder pain secondary to osteoarthritis- scheduled for shoulder reverse arthroplasty

## 2018-05-03 NOTE — PATIENT PROFILE ADULT. - PSH
History of lumpectomy of right breast  2003 - radiation also  S/P appendectomy  At Childhood  S/P carpal tunnel release  Right & Left ( April 2018 )  S/P laparoscopic cholecystectomy    S/P shoulder replacement, left  12/28/2017  S/P tonsillectomy  At Childhood

## 2018-05-04 DIAGNOSIS — I10 ESSENTIAL (PRIMARY) HYPERTENSION: ICD-10-CM

## 2018-05-04 DIAGNOSIS — M25.519 PAIN IN UNSPECIFIED SHOULDER: ICD-10-CM

## 2018-05-13 ENCOUNTER — TRANSCRIPTION ENCOUNTER (OUTPATIENT)
Age: 78
End: 2018-05-13

## 2018-05-14 ENCOUNTER — INPATIENT (INPATIENT)
Facility: HOSPITAL | Age: 78
LOS: 0 days | Discharge: HOME HEALTH | End: 2018-05-15
Attending: ORTHOPAEDIC SURGERY | Admitting: ORTHOPAEDIC SURGERY
Payer: MEDICARE

## 2018-05-14 ENCOUNTER — RESULT REVIEW (OUTPATIENT)
Age: 78
End: 2018-05-14

## 2018-05-14 ENCOUNTER — TRANSCRIPTION ENCOUNTER (OUTPATIENT)
Age: 78
End: 2018-05-14

## 2018-05-14 VITALS
WEIGHT: 160.06 LBS | RESPIRATION RATE: 15 BRPM | OXYGEN SATURATION: 97 % | TEMPERATURE: 98 F | SYSTOLIC BLOOD PRESSURE: 138 MMHG | DIASTOLIC BLOOD PRESSURE: 69 MMHG | HEIGHT: 58 IN | HEART RATE: 96 BPM

## 2018-05-14 DIAGNOSIS — Z90.49 ACQUIRED ABSENCE OF OTHER SPECIFIED PARTS OF DIGESTIVE TRACT: Chronic | ICD-10-CM

## 2018-05-14 DIAGNOSIS — Z90.89 ACQUIRED ABSENCE OF OTHER ORGANS: Chronic | ICD-10-CM

## 2018-05-14 DIAGNOSIS — Z98.890 OTHER SPECIFIED POSTPROCEDURAL STATES: Chronic | ICD-10-CM

## 2018-05-14 DIAGNOSIS — Z96.612 PRESENCE OF LEFT ARTIFICIAL SHOULDER JOINT: Chronic | ICD-10-CM

## 2018-05-14 DIAGNOSIS — Z90.11 ACQUIRED ABSENCE OF RIGHT BREAST AND NIPPLE: Chronic | ICD-10-CM

## 2018-05-14 PROCEDURE — 88309 TISSUE EXAM BY PATHOLOGIST: CPT | Mod: 26

## 2018-05-14 PROCEDURE — 88311 DECALCIFY TISSUE: CPT | Mod: 26

## 2018-05-14 PROCEDURE — 99223 1ST HOSP IP/OBS HIGH 75: CPT

## 2018-05-14 RX ORDER — OXYCODONE HYDROCHLORIDE 5 MG/1
10 TABLET ORAL EVERY 12 HOURS
Qty: 0 | Refills: 0 | Status: DISCONTINUED | OUTPATIENT
Start: 2018-05-14 | End: 2018-05-15

## 2018-05-14 RX ORDER — ACETAMINOPHEN 500 MG
1000 TABLET ORAL ONCE
Qty: 0 | Refills: 0 | Status: COMPLETED | OUTPATIENT
Start: 2018-05-14 | End: 2018-05-14

## 2018-05-14 RX ORDER — SODIUM CHLORIDE 9 MG/ML
1000 INJECTION INTRAMUSCULAR; INTRAVENOUS; SUBCUTANEOUS
Qty: 0 | Refills: 0 | Status: DISCONTINUED | OUTPATIENT
Start: 2018-05-14 | End: 2018-05-15

## 2018-05-14 RX ORDER — SODIUM CHLORIDE 9 MG/ML
1000 INJECTION, SOLUTION INTRAVENOUS
Qty: 0 | Refills: 0 | Status: DISCONTINUED | OUTPATIENT
Start: 2018-05-14 | End: 2018-05-14

## 2018-05-14 RX ORDER — OXYCODONE HYDROCHLORIDE 5 MG/1
5 TABLET ORAL EVERY 4 HOURS
Qty: 0 | Refills: 0 | Status: DISCONTINUED | OUTPATIENT
Start: 2018-05-14 | End: 2018-05-15

## 2018-05-14 RX ORDER — TRANEXAMIC ACID 100 MG/ML
1000 INJECTION, SOLUTION INTRAVENOUS ONCE
Qty: 0 | Refills: 0 | Status: COMPLETED | OUTPATIENT
Start: 2018-05-14 | End: 2018-05-14

## 2018-05-14 RX ORDER — OXYCODONE HYDROCHLORIDE 5 MG/1
10 TABLET ORAL EVERY 4 HOURS
Qty: 0 | Refills: 0 | Status: DISCONTINUED | OUTPATIENT
Start: 2018-05-14 | End: 2018-05-15

## 2018-05-14 RX ORDER — ONDANSETRON 8 MG/1
4 TABLET, FILM COATED ORAL ONCE
Qty: 0 | Refills: 0 | Status: DISCONTINUED | OUTPATIENT
Start: 2018-05-14 | End: 2018-05-14

## 2018-05-14 RX ORDER — LOSARTAN POTASSIUM 100 MG/1
50 TABLET, FILM COATED ORAL DAILY
Qty: 0 | Refills: 0 | Status: DISCONTINUED | OUTPATIENT
Start: 2018-05-14 | End: 2018-05-15

## 2018-05-14 RX ORDER — FENTANYL CITRATE 50 UG/ML
50 INJECTION INTRAVENOUS
Qty: 0 | Refills: 0 | Status: DISCONTINUED | OUTPATIENT
Start: 2018-05-14 | End: 2018-05-14

## 2018-05-14 RX ORDER — SODIUM CHLORIDE 9 MG/ML
3 INJECTION INTRAMUSCULAR; INTRAVENOUS; SUBCUTANEOUS EVERY 8 HOURS
Qty: 0 | Refills: 0 | Status: DISCONTINUED | OUTPATIENT
Start: 2018-05-14 | End: 2018-05-14

## 2018-05-14 RX ORDER — DOCUSATE SODIUM 100 MG
100 CAPSULE ORAL THREE TIMES A DAY
Qty: 0 | Refills: 0 | Status: DISCONTINUED | OUTPATIENT
Start: 2018-05-14 | End: 2018-05-15

## 2018-05-14 RX ORDER — HYDROCHLOROTHIAZIDE 25 MG
25 TABLET ORAL DAILY
Qty: 0 | Refills: 0 | Status: DISCONTINUED | OUTPATIENT
Start: 2018-05-14 | End: 2018-05-15

## 2018-05-14 RX ORDER — ASPIRIN/CALCIUM CARB/MAGNESIUM 324 MG
325 TABLET ORAL DAILY
Qty: 0 | Refills: 0 | Status: DISCONTINUED | OUTPATIENT
Start: 2018-05-14 | End: 2018-05-15

## 2018-05-14 RX ADMIN — Medication 100 MILLIGRAM(S): at 17:41

## 2018-05-14 RX ADMIN — Medication 100 MILLIGRAM(S): at 21:46

## 2018-05-14 RX ADMIN — OXYCODONE HYDROCHLORIDE 10 MILLIGRAM(S): 5 TABLET ORAL at 22:55

## 2018-05-14 RX ADMIN — OXYCODONE HYDROCHLORIDE 10 MILLIGRAM(S): 5 TABLET ORAL at 17:42

## 2018-05-14 RX ADMIN — Medication 1000 MILLIGRAM(S): at 15:19

## 2018-05-14 RX ADMIN — OXYCODONE HYDROCHLORIDE 10 MILLIGRAM(S): 5 TABLET ORAL at 21:55

## 2018-05-14 RX ADMIN — Medication 100 MILLIGRAM(S): at 20:16

## 2018-05-14 RX ADMIN — Medication 325 MILLIGRAM(S): at 21:46

## 2018-05-14 RX ADMIN — OXYCODONE HYDROCHLORIDE 10 MILLIGRAM(S): 5 TABLET ORAL at 18:40

## 2018-05-14 RX ADMIN — SODIUM CHLORIDE 75 MILLILITER(S): 9 INJECTION, SOLUTION INTRAVENOUS at 14:15

## 2018-05-14 RX ADMIN — TRANEXAMIC ACID 220 MILLIGRAM(S): 100 INJECTION, SOLUTION INTRAVENOUS at 14:14

## 2018-05-14 RX ADMIN — Medication 400 MILLIGRAM(S): at 14:49

## 2018-05-14 NOTE — DISCHARGE NOTE ADULT - NS AS ACTIVITY OBS
Stairs allowed/No Heavy lifting/straining/Walking-Outdoors allowed/Walking-Indoors allowed/Do not drive or operate machinery

## 2018-05-14 NOTE — PHYSICAL THERAPY INITIAL EVALUATION ADULT - MODIFIED CLINICAL TEST OF SENSORY INTEGRATION IN BALANCE TEST
Barthel Index: Feeding Score _5/10__, Bathing Score _0/5__, Grooming Score _0/5__, Dressing Score _5/10__, Bowels Score __10/10_, Bladder Score _10/10__, Toilet Score _10/10__, Transfers Score _15/15__, Mobility Score __10/15_, Stairs Score _10/10__,     Total Score ___75/100

## 2018-05-14 NOTE — DISCHARGE NOTE ADULT - CARE PROVIDER_API CALL
Doe Forte), Orthopaedic Surgery  [address] [place] [address]  Phone: [phone]  Fax: [phone]

## 2018-05-14 NOTE — PHYSICAL THERAPY INITIAL EVALUATION ADULT - GENERAL OBSERVATIONS, REHAB EVAL
Pt encountered supine in bed + dressing & sling donned to RUE (clean, dry, & intact), pneumatic teds donned B/L, son at bedside

## 2018-05-14 NOTE — PHYSICAL THERAPY INITIAL EVALUATION ADULT - ADDITIONAL COMMENTS
Pt lives with his son in a private home, 4 entry steps (+ railing), no steps inside to negotiate. Prior to admission pt was independent with all functional mobility including community ambulation without a device. Pt is right hand dominant,

## 2018-05-14 NOTE — BRIEF OPERATIVE NOTE - PROCEDURE
<<-----Click on this checkbox to enter Procedure Shoulder arthroplasty, right  05/14/2018  Right reverse shoulder arthroplasty  Active  JORGE

## 2018-05-14 NOTE — PHYSICAL THERAPY INITIAL EVALUATION ADULT - GAIT DEVIATIONS NOTED, PT EVAL
decreased stride length/decreased weight-shifting ability/decreased richie/increased trunk flexion/decreased step length

## 2018-05-14 NOTE — PROGRESS NOTE ADULT - SUBJECTIVE AND OBJECTIVE BOX
Post-op Check  POD#0 s/p Right Reverse TSA   77yFemale Patient seen and examined, Pain controlled  Patient Denies SOB, CP, N/V/D       PE: Right Shoulder/UE: Dressing C/D/I, HV intact, Sensation/motor intact, Radial 2+, FROM wrist/fingers            A: As above   P: Pain Control       DVT Prophylaxis      Incentive spirometry      PT WBAT NWB      No Active/Passive ROM Right Shoulder      Discharge Planning      All the above discussed and understood by pt       Ortho to F/U

## 2018-05-14 NOTE — PHYSICAL THERAPY INITIAL EVALUATION ADULT - PLANNED THERAPY INTERVENTIONS, PT EVAL
strengthening/transfer training/bed mobility training/balance training/gait training/stair negotiation

## 2018-05-14 NOTE — DISCHARGE NOTE ADULT - HOSPITAL COURSE
77yFemale with history of Right Shoulder Pain presenting for Right Reverse TSA by Dr. Forte on 5/14/18. Risk and benefits of surgery were explained to the patient. The patient understood and agreed to proceed with surgery. Patient underwent the procedure with no intraoperative complications. Pt was brought in stable condition to the PACU. Once stable in PACU, pt was brought to the floor. During hospital stay pt was followed by Medicine, Pt had an uneventful hospital course. Pt is stable for discharge to Home with Home Care Services and PT

## 2018-05-14 NOTE — DISCHARGE NOTE ADULT - CARE PLAN
Principal Discharge DX:	Primary osteoarthritis of right shoulder  Goal:	Improve Function, Decrease Pain  Assessment and plan of treatment:	Keep Dressing Clean, Dry and Intact. May shower on POD#5 with Dressing on. Dressing may be removed on POD#7. Please do not scrub, soak, peel or pick at the dressing. No creams, lotions, or oils over dressing. May shower on POD#5 and let water run over dressing, no baths. Pat dry once out of shower.     If dressing is saturated from border to border. Remove dressing and cover with clean dry dressing.

## 2018-05-14 NOTE — DISCHARGE NOTE ADULT - MEDICATION SUMMARY - MEDICATIONS TO TAKE
I will START or STAY ON the medications listed below when I get home from the hospital:    Probiotic  -- 1 tab(s) by mouth once a day  -- Indication: For Probiotic    Aspirin Enteric Coated 325 mg oral delayed release tablet  -- 1 tab(s) by mouth once a day MDD:1 tab  -- Swallow whole.  Do not crush.  Take with food or milk.    -- Indication: For DVT prophylaxis    oxyCODONE 5 mg oral capsule  -- 1-2 cap(s) by mouth every 6 hours, As Needed -for severe pain MDD:8 caps   -- Caution federal law prohibits the transfer of this drug to any person other  than the person for whom it was prescribed.  It is very important that you take or use this exactly as directed.  Do not skip doses or discontinue unless directed by your doctor.  May cause drowsiness.  Alcohol may intensify this effect.  Use care when operating dangerous machinery.  This prescription cannot be refilled.  Using more of this medication than prescribed may cause serious breathing problems.    -- Indication: For Pain    Tylenol 325 mg oral tablet  -- 2 tab(s) by mouth every 6 hours, As Needed -for mild pain MDD:8 tabs  -- This product contains acetaminophen.  Do not use  with any other product containing acetaminophen to prevent possible liver damage.    -- Indication: For Pain    losartan 50 mg oral tablet  -- 1 tab(s) by mouth once a day  -- Indication: For HTN    hydroCHLOROthiazide 25 mg oral tablet  -- 1 tab(s) by mouth once a day  -- Indication: For HTN    Colace 100 mg oral capsule  -- 1 cap(s) by mouth 2 times a day, As Needed -for constipation MDD:2 caps  -- Medication should be taken with plenty of water.    -- Indication: For Constipation    Centrum Silver Women's oral tablet  -- 1 tab(s) by mouth once a day  -- Indication: For Vitmain    Cod Liver Oil  -- 1 dose(s) by mouth once a day  -- Indication: For Supplement

## 2018-05-14 NOTE — CONSULT NOTE ADULT - ASSESSMENT
76 yo female c/o right shoulder pain secondary to osteoarthritis- POD 0 s/p  shoulder reverse arthroplasty    Doing well no anticipated medicine needs   DC planning to home  -DVT prophylaxis   -pain control   -PT evaluation

## 2018-05-14 NOTE — CONSULT NOTE ADULT - SUBJECTIVE AND OBJECTIVE BOX
HPI:78 yo female c/o right shoulder pain secondary to osteoarthritis- POD 0  shoulder reverse arthroplasty        PAST MEDICAL & SURGICAL HISTORY:  Primary osteoarthritis of left shoulder  Breast cancer: right 2003  Hypertension  S/P shoulder replacement, left: 12/28/2017  S/P carpal tunnel release: Right &amp; Left ( April 2018 )  S/P laparoscopic cholecystectomy  S/P tonsillectomy: At Childhood  S/P appendectomy: At Childhood  History of lumpectomy of right breast: 2003 - radiation also      REVIEW OF SYSTEMS:    CONSTITUTIONAL: No fever, weight loss, or fatigue  EYES: No eye pain, visual disturbances, or discharge  ENMT:  No difficulty hearing, tinnitus, vertigo; No sinus or throat pain  NECK: No pain or stiffness  BREASTS: No pain, masses, or nipple discharge  RESPIRATORY: No cough, wheezing, chills or hemoptysis; No shortness of breath  CARDIOVASCULAR: No chest pain, palpitations, dizziness, or leg swelling  GASTROINTESTINAL: No abdominal or epigastric pain. No nausea, vomiting, or hematemesis; No diarrhea or constipation. No melena or hematochezia.  GENITOURINARY: No dysuria, frequency, hematuria, or incontinence  NEUROLOGICAL: b/l finger numbness   SKIN: No itching, burning, rashes, or lesions   LYMPH NODES: No enlarged glands  ENDOCRINE: No heat or cold intolerance; No hair loss  MUSCULOSKELETAL:right shoulder pain  PSYCHIATRIC: No depression, anxiety, mood swings, or difficulty sleeping  HEME/LYMPH: No easy bruising, or bleeding gums  ALLERGY AND IMMUNOLOGIC: No hives or eczema      MEDICATIONS  (STANDING):  aspirin 325 milliGRAM(s) Oral daily  clindamycin IVPB 900 milliGRAM(s) IV Intermittent every 8 hours  docusate sodium 100 milliGRAM(s) Oral three times a day  hydrochlorothiazide 25 milliGRAM(s) Oral daily  losartan 50 milliGRAM(s) Oral daily  multivitamin 1 Tablet(s) Oral daily  sodium chloride 0.9%. 1000 milliLiter(s) (75 mL/Hr) IV Continuous <Continuous>    MEDICATIONS  (PRN):  oxyCODONE    IR 10 milliGRAM(s) Oral every 4 hours PRN Moderate Pain  oxyCODONE    IR 5 milliGRAM(s) Oral every 4 hours PRN Mild Pain  oxyCODONE  ER Tablet 10 milliGRAM(s) Oral every 12 hours PRN Severe Pain      Allergies    amoxicillin (Hives)  penicillin (Hives)    Intolerances        SOCIAL HISTORY:    FAMILY HISTORY:  Family history of breast cancer in sister (Sibling)  Diabetes mellitus (Sibling): brother      Vital Signs Last 24 Hrs  T(C): 36.4 (14 May 2018 17:34), Max: 36.8 (14 May 2018 15:00)  T(F): 97.6 (14 May 2018 17:34), Max: 98.3 (14 May 2018 15:00)  HR: 76 (14 May 2018 17:34) (64 - 96)  BP: 135/62 (14 May 2018 17:34) (115/49 - 138/69)  BP(mean): --  RR: 16 (14 May 2018 17:34) (14 - 20)  SpO2: 97% (14 May 2018 17:34) (95% - 100%)    PHYSICAL EXAM:    GENERAL: NAD, well-groomed, well-developed  HEAD:  Atraumatic, Normocephalic  EYES: EOMI, PERRLA, conjunctiva and sclera clear  ENMT: No tonsillar erythema, exudates, or enlargement; Moist mucous membranes, Good dentition, No lesions  NECK: Supple, No JVD, Normal thyroid  NERVOUS SYSTEM:  Alert & Oriented X3, Good concentration; Motor Strength 5/5 B/L upper and lower extremities; DTRs 2+ intact and symmetric  CHEST/LUNG: Clear to percussion bilaterally; No rales, rhonchi, wheezing, or rubs  HEART: Regular rate and rhythm; No murmurs, rubs, or gallops  ABDOMEN: Soft, Nontender, Nondistended; Bowel sounds present  EXTREMITIES: right shoulder in dressing   LYMPH: No lymphadenopathy   SKIN: No rashes or lesions      LABS:                RADIOLOGY & ADDITIONAL STUDIES:

## 2018-05-14 NOTE — PHYSICAL THERAPY INITIAL EVALUATION ADULT - IMPAIRMENTS FOUND, PT EVAL
muscle strength/decreased endurance, stair negotiation/aerobic capacity/endurance/gait, locomotion, and balance

## 2018-05-15 VITALS
DIASTOLIC BLOOD PRESSURE: 59 MMHG | TEMPERATURE: 98 F | OXYGEN SATURATION: 93 % | SYSTOLIC BLOOD PRESSURE: 122 MMHG | HEART RATE: 92 BPM | RESPIRATION RATE: 16 BRPM

## 2018-05-15 LAB
ALBUMIN SERPL ELPH-MCNC: 2.9 G/DL — LOW (ref 3.3–5)
ALP SERPL-CCNC: 85 U/L — SIGNIFICANT CHANGE UP (ref 40–120)
ALT FLD-CCNC: 35 U/L — SIGNIFICANT CHANGE UP (ref 12–78)
ANION GAP SERPL CALC-SCNC: 8 MMOL/L — SIGNIFICANT CHANGE UP (ref 5–17)
AST SERPL-CCNC: 29 U/L — SIGNIFICANT CHANGE UP (ref 15–37)
BASOPHILS # BLD AUTO: 0.01 K/UL — SIGNIFICANT CHANGE UP (ref 0–0.2)
BASOPHILS NFR BLD AUTO: 0.1 % — SIGNIFICANT CHANGE UP (ref 0–2)
BILIRUB SERPL-MCNC: 0.6 MG/DL — SIGNIFICANT CHANGE UP (ref 0.2–1.2)
BUN SERPL-MCNC: 22 MG/DL — SIGNIFICANT CHANGE UP (ref 7–23)
CALCIUM SERPL-MCNC: 8.3 MG/DL — LOW (ref 8.5–10.1)
CHLORIDE SERPL-SCNC: 104 MMOL/L — SIGNIFICANT CHANGE UP (ref 96–108)
CO2 SERPL-SCNC: 27 MMOL/L — SIGNIFICANT CHANGE UP (ref 22–31)
CREAT SERPL-MCNC: 0.82 MG/DL — SIGNIFICANT CHANGE UP (ref 0.5–1.3)
EOSINOPHIL # BLD AUTO: 0 K/UL — SIGNIFICANT CHANGE UP (ref 0–0.5)
EOSINOPHIL NFR BLD AUTO: 0 % — SIGNIFICANT CHANGE UP (ref 0–6)
GLUCOSE SERPL-MCNC: 121 MG/DL — HIGH (ref 70–99)
HCT VFR BLD CALC: 31.1 % — LOW (ref 34.5–45)
HGB BLD-MCNC: 9.8 G/DL — LOW (ref 11.5–15.5)
IMM GRANULOCYTES NFR BLD AUTO: 0.4 % — SIGNIFICANT CHANGE UP (ref 0–1.5)
LYMPHOCYTES # BLD AUTO: 1.24 K/UL — SIGNIFICANT CHANGE UP (ref 1–3.3)
LYMPHOCYTES # BLD AUTO: 11.9 % — LOW (ref 13–44)
MCHC RBC-ENTMCNC: 27.5 PG — SIGNIFICANT CHANGE UP (ref 27–34)
MCHC RBC-ENTMCNC: 31.5 GM/DL — LOW (ref 32–36)
MCV RBC AUTO: 87.1 FL — SIGNIFICANT CHANGE UP (ref 80–100)
MONOCYTES # BLD AUTO: 0.64 K/UL — SIGNIFICANT CHANGE UP (ref 0–0.9)
MONOCYTES NFR BLD AUTO: 6.1 % — SIGNIFICANT CHANGE UP (ref 2–14)
NEUTROPHILS # BLD AUTO: 8.48 K/UL — HIGH (ref 1.8–7.4)
NEUTROPHILS NFR BLD AUTO: 81.5 % — HIGH (ref 43–77)
PLATELET # BLD AUTO: 301 K/UL — SIGNIFICANT CHANGE UP (ref 150–400)
POTASSIUM SERPL-MCNC: 4.3 MMOL/L — SIGNIFICANT CHANGE UP (ref 3.5–5.3)
POTASSIUM SERPL-SCNC: 4.3 MMOL/L — SIGNIFICANT CHANGE UP (ref 3.5–5.3)
PROT SERPL-MCNC: 6.4 GM/DL — SIGNIFICANT CHANGE UP (ref 6–8.3)
RBC # BLD: 3.57 M/UL — LOW (ref 3.8–5.2)
RBC # FLD: 16.2 % — HIGH (ref 10.3–14.5)
SODIUM SERPL-SCNC: 139 MMOL/L — SIGNIFICANT CHANGE UP (ref 135–145)
WBC # BLD: 10.41 K/UL — SIGNIFICANT CHANGE UP (ref 3.8–10.5)
WBC # FLD AUTO: 10.41 K/UL — SIGNIFICANT CHANGE UP (ref 3.8–10.5)

## 2018-05-15 PROCEDURE — 73030 X-RAY EXAM OF SHOULDER: CPT | Mod: 26,RT

## 2018-05-15 RX ORDER — ACETAMINOPHEN 500 MG
2 TABLET ORAL
Qty: 30 | Refills: 0
Start: 2018-05-15

## 2018-05-15 RX ORDER — ASPIRIN/CALCIUM CARB/MAGNESIUM 324 MG
1 TABLET ORAL
Qty: 0 | Refills: 0 | COMMUNITY

## 2018-05-15 RX ORDER — ASPIRIN/CALCIUM CARB/MAGNESIUM 324 MG
1 TABLET ORAL
Qty: 14 | Refills: 0
Start: 2018-05-15 | End: 2018-05-28

## 2018-05-15 RX ORDER — GLUCOSAMINE/MSM/CHONDROITIN A 750-375MG
1 TABLET ORAL
Qty: 0 | Refills: 0 | COMMUNITY

## 2018-05-15 RX ORDER — MILK THISTLE 150 MG
1 CAPSULE ORAL
Qty: 0 | Refills: 0 | COMMUNITY

## 2018-05-15 RX ORDER — OXYCODONE HYDROCHLORIDE 5 MG/1
1 TABLET ORAL
Qty: 30 | Refills: 0
Start: 2018-05-15

## 2018-05-15 RX ORDER — DOCUSATE SODIUM 100 MG
1 CAPSULE ORAL
Qty: 30 | Refills: 0
Start: 2018-05-15

## 2018-05-15 RX ADMIN — Medication 100 MILLIGRAM(S): at 05:06

## 2018-05-15 RX ADMIN — OXYCODONE HYDROCHLORIDE 10 MILLIGRAM(S): 5 TABLET ORAL at 10:22

## 2018-05-15 RX ADMIN — LOSARTAN POTASSIUM 50 MILLIGRAM(S): 100 TABLET, FILM COATED ORAL at 05:06

## 2018-05-15 RX ADMIN — Medication 100 MILLIGRAM(S): at 04:36

## 2018-05-15 RX ADMIN — Medication 25 MILLIGRAM(S): at 05:06

## 2018-05-15 RX ADMIN — OXYCODONE HYDROCHLORIDE 10 MILLIGRAM(S): 5 TABLET ORAL at 11:22

## 2018-05-15 NOTE — OCCUPATIONAL THERAPY INITIAL EVALUATION ADULT - RANGE OF MOTION EXAMINATION
deficits as listed below/trunk was WFL (within functional limits)/right UE shoulder not tested due to reverse shoulder replacement precautions, right elbow, wrist hand WFL grossly.  Left UE WFL.

## 2018-05-15 NOTE — PROGRESS NOTE ADULT - SUBJECTIVE AND OBJECTIVE BOX
77F POD#1 s/p R reverse TSA. Pt seen and examined in NAD. Pain controlled. Pt denies any new complaints. Pt denies CP/SOB/N/V/D/numbness/tingling. +Flatus. OOB to ambulate.    PE:   Gen: Alert, oriented, NAD  CV: S1S2 RRR  Lungs: CTA b/l, respirations  B/L UE: Skin intact. Right shoulder dressing c/d/i. Removed. Hemovac removed in entirety. Steris c/d/i. Dry, sterile dressing applied. Patient tolerated well. +ROM shoulder/elbow/wrist/fingers. +ok/thumbsup/fingercross signs.  strength: 5/5.  RP2+ NVI.   B/L LE: No calf tenderness, no pedal edema b/l                9.8    10.41 )-----------( 301      ( 15 May 2018 07:39 )             31.1       05-15    139  |  104  |  22  ----------------------------<  121<H>  4.3   |  27  |  0.82    Ca    8.3<L>      15 May 2018 07:39    TPro  6.4  /  Alb  2.9<L>  /  TBili  0.6  /  DBili  x   /  AST  29  /  ALT  35  /  AlkPhos  85  05-15    A/P: 77yFemale s/p R reverse TSA  PT: OOB to ambulate as tolerated, reverse shoulder precautions. Instructions given to patient  DVT ppx: SCDs, ASA   Wound care, Isometric exercises, incentive spirometry   Discharge: planning today once cleared by OT  All the above discussed and understood by pt

## 2018-05-15 NOTE — OCCUPATIONAL THERAPY INITIAL EVALUATION ADULT - ADDITIONAL COMMENTS
Pt lives with son in private home with 4 stairs with hand rails to enter.  Pt reports she lives on main level with bedroom and bathroom containing bath tub with grab bars and standard toilet. Pt reports being independent with basic ADLs and functional mobility prior to surgery.  Pt's son reports that he took off from work for 1-2 weeks to assist with pt care at home. Pt lives with son in private home with 4 stairs with hand rails to enter.  Pt reports she lives on main level with bedroom and bathroom containing bath tub with grab bars and standard toilet. Pt reports being independent with basic ADLs and functional mobility with no devices prior to surgery.  Pt's son reports that he took off from work for 1-2 weeks to assist with pt care at home.  Pt and son educated on non weight bearing status of right UE, reverse shoulder replacement precautions and sling recommendations. Pt and son provided educational handout reviewing reverse shoulder replacement precautions.     Pt performed bed mobility supine to sit with supervision, bed to chair transfer with supervision, functional mobility to bathroom toilet with supervision, and lower body dressing with supervision with sling to right UE and maintaining NWB precaution.

## 2018-05-15 NOTE — OCCUPATIONAL THERAPY INITIAL EVALUATION ADULT - MANUAL MUSCLE TESTING RESULTS, REHAB EVAL
not tested due to/right UE not tested due to non weight bearing status, left UE 3+/5 grossly shoulder, elbow, wrist and hand

## 2018-05-15 NOTE — OCCUPATIONAL THERAPY INITIAL EVALUATION ADULT - PLANNED THERAPY INTERVENTIONS, OT EVAL
transfer training/parent/caregiver training.../strengthening/bed mobility training/ADL retraining/balance training/ROM

## 2018-05-24 DIAGNOSIS — Z88.0 ALLERGY STATUS TO PENICILLIN: ICD-10-CM

## 2018-05-24 DIAGNOSIS — Z85.3 PERSONAL HISTORY OF MALIGNANT NEOPLASM OF BREAST: ICD-10-CM

## 2018-05-24 DIAGNOSIS — M19.011 PRIMARY OSTEOARTHRITIS, RIGHT SHOULDER: ICD-10-CM

## 2018-05-24 DIAGNOSIS — I10 ESSENTIAL (PRIMARY) HYPERTENSION: ICD-10-CM

## 2018-05-24 DIAGNOSIS — M75.101 UNSPECIFIED ROTATOR CUFF TEAR OR RUPTURE OF RIGHT SHOULDER, NOT SPECIFIED AS TRAUMATIC: ICD-10-CM

## 2018-05-24 DIAGNOSIS — M81.0 AGE-RELATED OSTEOPOROSIS WITHOUT CURRENT PATHOLOGICAL FRACTURE: ICD-10-CM

## 2018-05-24 DIAGNOSIS — Z88.1 ALLERGY STATUS TO OTHER ANTIBIOTIC AGENTS STATUS: ICD-10-CM

## 2018-05-24 DIAGNOSIS — M19.012 PRIMARY OSTEOARTHRITIS, LEFT SHOULDER: ICD-10-CM

## 2019-05-21 NOTE — OCCUPATIONAL THERAPY INITIAL EVALUATION ADULT - PATIENT/FAMILY/SIGNIFICANT OTHER GOALS STATEMENT, OT EVAL
[FreeTextEntry1] : health maintenance  he will bring his vaccine chart and also bmi is normal 20 .  He is presently up to date with eye and dental but should revisit dental.  Right inguinal strain he will have sonogram and if needs fu with surgeon if needed. He could have strain in inguinal area and will avoid lifting anything heavy and will wait to exercise  allergies will test for allergies He states when he peels shrimp he gets swelling of his hands .  he will bring his vaccine record.   "Go home and not be in pain".

## 2019-06-17 PROBLEM — C50.919 MALIGNANT NEOPLASM OF UNSPECIFIED SITE OF UNSPECIFIED FEMALE BREAST: Chronic | Status: ACTIVE | Noted: 2017-12-18

## 2019-06-17 PROBLEM — I10 ESSENTIAL (PRIMARY) HYPERTENSION: Chronic | Status: ACTIVE | Noted: 2017-12-18

## 2019-06-24 ENCOUNTER — TRANSCRIPTION ENCOUNTER (OUTPATIENT)
Age: 79
End: 2019-06-24

## 2019-06-25 ENCOUNTER — OUTPATIENT (OUTPATIENT)
Dept: OUTPATIENT SERVICES | Facility: HOSPITAL | Age: 79
LOS: 1 days | End: 2019-06-25
Payer: MEDICARE

## 2019-06-25 DIAGNOSIS — Z90.49 ACQUIRED ABSENCE OF OTHER SPECIFIED PARTS OF DIGESTIVE TRACT: Chronic | ICD-10-CM

## 2019-06-25 DIAGNOSIS — Z96.612 PRESENCE OF LEFT ARTIFICIAL SHOULDER JOINT: Chronic | ICD-10-CM

## 2019-06-25 DIAGNOSIS — Z90.89 ACQUIRED ABSENCE OF OTHER ORGANS: Chronic | ICD-10-CM

## 2019-06-25 DIAGNOSIS — Z98.890 OTHER SPECIFIED POSTPROCEDURAL STATES: Chronic | ICD-10-CM

## 2019-06-25 DIAGNOSIS — Z90.11 ACQUIRED ABSENCE OF RIGHT BREAST AND NIPPLE: Chronic | ICD-10-CM

## 2019-06-25 DIAGNOSIS — Z12.11 ENCOUNTER FOR SCREENING FOR MALIGNANT NEOPLASM OF COLON: ICD-10-CM

## 2019-06-25 PROCEDURE — G0121: CPT

## 2019-07-10 NOTE — OCCUPATIONAL THERAPY INITIAL EVALUATION ADULT - HEALTH SCREEN CRITERIA
Patient calling back again for pcp recommendations.  PSR told patient we will send message to let Dr Jorge know.   Will call pt back later today.    yes

## 2020-10-08 ENCOUNTER — APPOINTMENT (OUTPATIENT)
Dept: SURGERY | Facility: CLINIC | Age: 80
End: 2020-10-08
Payer: MEDICARE

## 2020-10-08 ENCOUNTER — TRANSCRIPTION ENCOUNTER (OUTPATIENT)
Age: 80
End: 2020-10-08

## 2020-10-08 PROCEDURE — 99213 OFFICE O/P EST LOW 20 MIN: CPT

## 2020-12-24 NOTE — H&P PST ADULT - GUM GEN PE MLT EXAM PC
Denies SI/H  Prefers to leave medications at current levels, despite not knowing how well Wellbutrin at current dosage is helping him.  Meds refilled  Red flags reviewed   not examined

## 2021-04-08 ENCOUNTER — APPOINTMENT (OUTPATIENT)
Dept: SURGERY | Facility: CLINIC | Age: 81
End: 2021-04-08
Payer: MEDICARE

## 2021-04-08 PROCEDURE — 99213 OFFICE O/P EST LOW 20 MIN: CPT

## 2021-05-16 NOTE — PATIENT PROFILE ADULT. - FUNCTIONAL SCREEN CURRENT LEVEL: SWALLOWING (IF SCORE 2 OR MORE FOR ANY ITEM, CONSULT REHAB SERVICES), MLM)
Implemented All Universal Safety Interventions:  [place] to call system. Call bell, personal items and telephone within reach. Instruct patient to call for assistance. Room bathroom lighting operational. Non-slip footwear when patient is off stretcher. Physically safe environment: no spills, clutter or unnecessary equipment. Stretcher in lowest position, wheels locked, appropriate side rails in place.
(0) swallows foods/liquids without difficulty

## 2021-06-07 NOTE — PATIENT PROFILE ADULT. - FUNCTIONAL SCREEN CURRENT LEVEL: TRANSFERRING, MLM
EGD OPERATIVE NOTE    GASTROENTEROLOGIST: Loly Streeter MD    ASSISTANT: None    2021    Kathleen Plummer  : 1990  MRN: [id number]    PRE-OPERATIVE DIAGNOSES / INDICATIONS:  · GERD    EGD DONE 2021, REVEALED:  · Evidence of gastric body and antral erythema and superficial erosions, concerning for moderate erosive gastritis. Biopsies obtained to evaluate for H.pylori.  · LA Grade B distal esophagitis. Distal esophageal biopsies obtained.  · Normal duodenal mucosa. Biopsies obtained from bulb and 2nd portion to evaluate for celiac disease.    OPERATION PERFORMED:  · EGD with the help of anesthesia service.    EBL:  None    ANESTHESIA:  Sedation provided by anesthesia service.  Oxygen nasal cannula.  Zofran 4 mg IV.    SPECIMENS: See lab results.    COMPLICATIONS: None      IMPLANTS: N/A.    ASSISTANT TASKS: N/A.    DESCRIPTION OF OPERATION:  The risks and benefits of the procedure were explained to the patient, which are not limited only to but included bleeding, infection, missed polyps and lesions, perforation, device malfunction, and death; the patient demonstrated an understanding and then consented to the procedure. Informed consent discussion included discussion of risks of sedation as well. The history was reviewed and the patient was examined. The patient was deemed stable for the procedure and monitored throughout.      Patient was placed in supine position. A bite-block was placed between incisors, and the gastroscope was then passed through to duodenal bulb.     Esophagus: The gastroesophageal junction and hiatus were at 40  cm from incisors.  LA Grade B distal esophagitis. Distal esophageal biopsies obtained.     Stomach:  Evidence of gastric body and antral erythema and superficial erosions, concerning for moderate erosive gastritis. Biopsies obtained to evaluate for H.pylori.     Duodenum: First and second portions of the duodenum visualized and appear grossly normal with no erythema,  edema, or lesions identified; multiple random duodenal biopsies obtained.     No active bleeding or signs of recent bleeding visualized.     Patient tolerated procedure without complications or blood loss.     Following the procedure, the stomach was decompressed, the endoscope was removed and the procedure was terminated. Immediate postoperative condition of the patient was normal and the patient was transferred to the recovery area.      RECOMMENDATIONS:  · Recommend famotidine 20 mg BID PO daily since patient is breastfeeding  · If symptoms are not well controlled on H2 blockers, can take 20 mg omeprazole PO daily.   · Recommend avoidance of NSAIDs, ETOH and tobacco.  · Complete hemostasis was also appreciated after the procedure was performed.  Patient was instructed to report to the emergency department if overt gastrointestinal bleeding occurs and verbalized understanding.  · Await pathology with additional management recommendations to follow if necessitated.  · Outpatient clinic follow-up with referring provider     (0) independent

## 2021-07-29 DIAGNOSIS — Z78.9 OTHER SPECIFIED HEALTH STATUS: ICD-10-CM

## 2021-07-29 DIAGNOSIS — Z82.49 FAMILY HISTORY OF ISCHEMIC HEART DISEASE AND OTHER DISEASES OF THE CIRCULATORY SYSTEM: ICD-10-CM

## 2021-07-29 DIAGNOSIS — Z82.0 FAMILY HISTORY OF EPILEPSY AND OTHER DISEASES OF THE NERVOUS SYSTEM: ICD-10-CM

## 2021-07-29 DIAGNOSIS — Z80.0 FAMILY HISTORY OF MALIGNANT NEOPLASM OF DIGESTIVE ORGANS: ICD-10-CM

## 2021-07-29 DIAGNOSIS — Z80.1 FAMILY HISTORY OF MALIGNANT NEOPLASM OF TRACHEA, BRONCHUS AND LUNG: ICD-10-CM

## 2021-07-29 DIAGNOSIS — Z83.3 FAMILY HISTORY OF DIABETES MELLITUS: ICD-10-CM

## 2021-07-29 DIAGNOSIS — I10 ESSENTIAL (PRIMARY) HYPERTENSION: ICD-10-CM

## 2021-07-29 DIAGNOSIS — Z80.9 FAMILY HISTORY OF MALIGNANT NEOPLASM, UNSPECIFIED: ICD-10-CM

## 2021-07-29 DIAGNOSIS — M19.90 UNSPECIFIED OSTEOARTHRITIS, UNSPECIFIED SITE: ICD-10-CM

## 2021-07-29 PROBLEM — Z00.00 ENCOUNTER FOR PREVENTIVE HEALTH EXAMINATION: Status: ACTIVE | Noted: 2021-07-29

## 2021-07-29 RX ORDER — FUROSEMIDE 40 MG/1
40 TABLET ORAL DAILY
Refills: 0 | Status: ACTIVE | COMMUNITY

## 2021-07-29 RX ORDER — BIOTIN 10 MG
10000 TABLET ORAL DAILY
Refills: 0 | Status: ACTIVE | COMMUNITY

## 2021-07-29 RX ORDER — ASPIRIN 81 MG/1
81 TABLET ORAL DAILY
Refills: 0 | Status: ACTIVE | COMMUNITY

## 2021-07-29 RX ORDER — VALSARTAN AND HYDROCHLOROTHIAZIDE 160; 25 MG/1; MG/1
160-25 TABLET, FILM COATED ORAL DAILY
Refills: 0 | Status: ACTIVE | COMMUNITY

## 2021-07-29 RX ORDER — MULTIVIT-MIN/FA/LYCOPEN/LUTEIN .4-300-25
TABLET ORAL DAILY
Refills: 0 | Status: ACTIVE | COMMUNITY

## 2021-07-29 RX ORDER — CHROMIUM 200 MCG
1000 TABLET ORAL DAILY
Refills: 0 | Status: ACTIVE | COMMUNITY

## 2021-10-18 ENCOUNTER — APPOINTMENT (OUTPATIENT)
Dept: SURGERY | Facility: CLINIC | Age: 81
End: 2021-10-18
Payer: MEDICARE

## 2021-10-18 VITALS
WEIGHT: 135 LBS | DIASTOLIC BLOOD PRESSURE: 88 MMHG | BODY MASS INDEX: 28.34 KG/M2 | TEMPERATURE: 97.5 F | HEART RATE: 76 BPM | HEIGHT: 58 IN | OXYGEN SATURATION: 97 % | SYSTOLIC BLOOD PRESSURE: 150 MMHG

## 2021-10-18 DIAGNOSIS — Z80.3 FAMILY HISTORY OF MALIGNANT NEOPLASM OF BREAST: ICD-10-CM

## 2021-10-18 PROCEDURE — 99213 OFFICE O/P EST LOW 20 MIN: CPT

## 2021-10-19 NOTE — DISCHARGE NOTE ADULT - NSFTFSERV2RD_GEN_ALL_CORE
Brief Postoperative Note    Patient: Markell Amos  YOB: 1965  MRN: [id number]    Date of Procedure: 10/19/2021     Pre-Op Diagnosis: LEFT BREAST CANCER    Post-Op Diagnosis: Same as preoperative diagnosis. Procedure(s):  PORTACATH INSERTION    Surgeon(s):  Eleanor Tafoya MD    Surgical Assistant: None    Anesthesia: MAC     Estimated Blood Loss (mL): Minimal    Complications: None    Specimens:   ID Type Source Tests Collected by Time Destination   1 : LEFT AXILLARY LYMPH NODE  Fresh Lymph Node  Eleanor Tafoya MD 10/19/2021 0908 Pathology        Implants:   Implant Name Type Inv.  Item Serial No.  Lot No. LRB No. Used Action   PORT ATTACH CATH POWERPORT 8FR --  - SNA  PORT ATTACH CATH POWERPORT 8FR --  NA [employer]WD [id number] Right 1 Implanted       Drains: * No LDAs found *    Findings: pathologic LN left axilla, core bx with ultrasound guidance, 8 FR power port right subclavian vein    Electronically Signed by Carmen Levin MD on 19/99/3200 at 9:41 AM Physical Therapy/Occupational Therapy/Nursing

## 2022-04-25 ENCOUNTER — APPOINTMENT (OUTPATIENT)
Dept: SURGERY | Facility: CLINIC | Age: 82
End: 2022-04-25
Payer: MEDICARE

## 2022-04-25 VITALS
BODY MASS INDEX: 28.34 KG/M2 | WEIGHT: 135 LBS | TEMPERATURE: 97.1 F | DIASTOLIC BLOOD PRESSURE: 95 MMHG | OXYGEN SATURATION: 98 % | SYSTOLIC BLOOD PRESSURE: 188 MMHG | HEART RATE: 94 BPM | HEIGHT: 58 IN

## 2022-04-25 PROCEDURE — 99213 OFFICE O/P EST LOW 20 MIN: CPT

## 2022-05-27 NOTE — PATIENT PROFILE ADULT. - SURGICAL SITE INCISION
Pt discharge at this time. D/C instructions reviewed by this nurse and Pt verbalized understanding. Medications reviewed and pharmacy confirmed. Answers all questions to the best of my ability.
no

## 2022-06-16 ENCOUNTER — APPOINTMENT (OUTPATIENT)
Dept: ORTHOPEDIC SURGERY | Facility: CLINIC | Age: 82
End: 2022-06-16
Payer: MEDICARE

## 2022-06-16 VITALS — BODY MASS INDEX: 28.34 KG/M2 | HEIGHT: 58 IN | WEIGHT: 135 LBS

## 2022-06-16 PROCEDURE — 20611 DRAIN/INJ JOINT/BURSA W/US: CPT

## 2022-06-16 PROCEDURE — 99214 OFFICE O/P EST MOD 30 MIN: CPT | Mod: 25

## 2022-06-16 NOTE — HISTORY OF PRESENT ILLNESS
[8] : 8 [7] : 7 [Sharp] : sharp [Retired] : Work status: retired [de-identified] : DOS: s/p L RSA 12/28/17, s/p R RSA 5/14/18\par \par 6/16/22: Here today for R knee orthovisc #1. \par \par 11/9/21: Here for R knee orthovisc #4.\par \par 11/2/21: Here for R knee orthovisc #3.\par \par 10/26/21: Here for R knee orthovisc #2.\par \par 10/19/21: here for f/u R knee and b/l shoulders. Completed orthovisc R knee 4/13/21. Sustained a fall 1-2 months ago onto R knee inducing pain. S/p b/l RSV 2017 and 2018. Here for annual x-rays, no shoulder pain.\par \par 4/13/21: Here for right knee orthovisc #4.\par \par 3/30/21: Here for right knee orthovisc #3.\par \par 3/23/21: Here for right knee orthovisc #2.\par \par 3/16/21: Follow up right knee. She states orthovisc helped but pain returned. She would like to repeat series. Her CTS is worsening.\par \par 9/15/20: Here for orthovisc #4 right knee.\par \par 9/8/20: Here for orthovisc #3 right knee.\par \par 9/1/20: Here for orthovisc #2 right knee\par \par 8/25/20: Here for orthovisc #1 right knee.\par \par 1/28/20: Here for orthovisc #4 right knee.\par \par 1/21/20: Here for orthovisc #3 right knee. \par \par 1/7/2020- She is for continued orthovisc to the right knee\par \par 12/31/19: Follow up bilateral shoulders and knees. Her shoulders are doing well. Her right knee has become more painful. Orthovisc helped in June.\par \par 7/30/19: Here for f/u s/p bilateral knee orthovisc. She feels some improvement in the left knee, but that is the one which had less pain initially.\par \par 6/18/19: for b/l knees orthovisc injections #4.\par \par 6/11/19: Here for b/l knee orthovisc #3.\par \par 6/4/19: Here for b/l knee orthovisc #2.\par \par 5/28/19: Here for follow up. Her PCP gave her gabapentin to help with nerve pain. She also has been getting diclofenac gel.\par \par 2/19/19: Here for follow up. She just finished her PT yesterday. she still is having issues with the hands. She is also having R > L knee pain, no treatment.\par \par 1/8/19: Here for follow up. She has less motion on the R side. She does has some discomfort with lifting.\par \par 11/20/18: Here for follow up. She feels achiness in both shoulders. The left shoulder she feels popping when reaches behind her back. She still has paresthesias in the hands.\par \par 9/8/18: Here for follow up. She has one more PT for the shoulder. She is in PT for the hands. She did fall off the couch onto the R shoulder this past week.\par \par 8/7/18: Here for follow up. Her shoulders feel good but her R side CTS is still bothering her. She is in PT.\par \par 7/26/18: Here for follow up. She saw Dr. Foster for her CTS.\par \par 6/12/18: Here for f/u swelling. Doppler U/S was neg for DVT. She is having her PT evaluation tonight.\par \par 5/29/18: Here today for suture removal and f/u. She saw Dr. Foster who recommended Hand PT. She c/o pain.\par \par 5/22/18: Here today because she was concerned about swelling and tingling in the index and middle fingers on both hands. She is s/p recent b/l CTR.\par \par 5/8/18:\par MRI R shoulder: Very severe glenohumeral joint osteoarthritis as detailed above, with degenerative absence of the glenoid labrum. Severe long head biceps tendinosis and tenosynovitis. Rotator cuff tendinosis as detailed above, with chronic full-thickness tear of the supraspinatus tendon and multiple focal full-thickness clips in the infraspinatus tendon. Supraspinatus muscle atrophy and edema. Communicating joint and bursal effusions. 1.1 cm osteocartilaginous body in the subscapularis synovial recess.\par \par CT scan R shoulder:\par Very severe glenohumeral joint arthritis. This may be an inflammatory arthropathy with superimposed osteoarthritic changes. Correlate clinically. There is an accompanying large joint effusion with chronic synovitis more evident on the concurrent MR study. See description above. Moderate subacromial/subdeltoid bursitis. Rotator cuff tendinopathy, with supraspinatus and infraspinatus muscle atrophy. Mild qualitative osteoporosis.\par \par 4/10/18: Here for follow up. Her R shoulder is painful. She is scheduled for L CTR 4/11/18 and R CTR 2 weeks later. Requesting to have R TSR. She is very limited with ADL's now, concerned she is putting too much pressure L shoulder to compensate. She has stopped PT, continued HEP. Ibuprofen 800mg has been helping, unable to take until after CTR.\par \par 3/27/18: Here for follow up. Her R shoulder is painful. She is scheduled for b/l CTR, but she wants to move up her surgery date she is not delayed for her R shoulder.\par \par 3/20/18 Here for f/u with her son, now 12 weeks s/p surgery with numbness and tingling/cold sensation left hand. No pain left shoulder post op. Right shoulder pain is worsening. PT stretching and massage helped. She is forced to use arm more now that her right arm is bothering her. Left hand pain limits her.\par \par 2/6/18: Here now 6 weeks s/p surgery with numbness and tingling/cold sensation left hand. No pain left shoulder post op. Right shoulder painful exacerbation. She is starting to use arm a little more at home, but is limited by shooting pain left hand.\par \par 1/9/18: Now about two weeks. Pain improving. She is wearing her sling. Denies fevers, chills.\par \par 12/5/17: Here for follow up recurring bilateral shoulder pain, L>R. Would like to schedule L TSA. Pain is constant and increased, even right side is worse. More "cracking" sensation. Ibuprofen 800mg daily tolerated. Orthovisc did not help.\par \par 11/21/17: Here for follow up. Her shoulders have been causing more pain. She wants to consider replacement.\par \par 7/3/17: Here for bilateral orthovisc #4\par \par 6/27/17: Here for bilateral orthovisc #3\par \par 6/20/17: Here for Orthovisc #2 bilateral shoulders. Worse today because she did gardening yesterday. Pain is intermittent when she increases activity. Ice QHS\par \par 6/13/17: Here today for recurring B/L shoulder pain. L > R today. Flare up last week. Aggravated by gardening. Continues to interfere with ADL's and activity. She is interested in doing the gel injections. Cortisone injection R shoulder was not of much help. She ices frequently, applies Salonpas and takes Tumeric. Ibuprofen 800mg when pain is severe.\par \par 3/7/17: 77 y/o RHD female here for the L shoulder. Initially started with R shoulder pain, as told she had DJD. Tried PT, minimal relief. She also had L shoulder pain. She states the R shoulder bothers her more. Bothers her with most activities but she can manage throughout the day. She has had no other treatment besides Mobic. [] : no [FreeTextEntry1] : R knee

## 2022-06-16 NOTE — H&P PST ADULT - PRESSURE ULCER(S)
"[facility]    Chief Complaint  Leg Swelling and Leg Pain    Subjective    History of Present Illness {CC  Problem List  Visit  Diagnosis   Encounters  Notes  Medications  Labs  Result Review Imaging  Media :23}     Ronald Bond is a 74 y.o. male with hypertension, hepatitis C, history of polysubstance abuse (on chronic methadone) and bacterial endocarditis (over 20 years ago) and chronic lower extremity edema who presents today for evaluation of ongoing bilateral lower extremity edema.  Patient was evaluated last week.  He recently had been treated for cellulitis with Keflex and edema had improved.  He had a chest x-ray that showed no evidence of pulmonary edema.  proBNP was normal.  He was called multiple times to discuss results and possibly changing his Lasix to another diuretic.  Message sent in my chart as well.  Patient called yesterday with complaints of leg weeping. He says his swelling has improved but he noticed some red spots that were leaking fluids.  He admits that he never elevates his legs.  He denies worsening shortness of breath. He again today asks me to put him on cymbalta.  He does continue to have some PND.      Objective     Vital Signs:   Vitals:    06/17/22 1357   BP: 167/79   BP Location: Left arm   Patient Position: Sitting   Cuff Size: Adult   Pulse: 78   Resp: 17   Temp: 98.1 °F (36.7 °C)   TempSrc: Temporal   SpO2: 95%   Weight: 109 kg (240 lb)   Height: 167.6 cm (65.98\")     Body mass index is 38.76 kg/m².  Physical Exam  Vitals reviewed.   Constitutional:       Appearance: Normal appearance.   HENT:      Head: Normocephalic.   Neck:      Vascular: No carotid bruit.   Cardiovascular:      Rate and Rhythm: Normal rate and regular rhythm.      Pulses: Normal pulses.      Heart sounds: Normal heart sounds, S1 normal and S2 normal. No murmur heard.  Pulmonary:      Effort: Pulmonary effort is normal. No respiratory distress.      Breath " sounds: Normal breath sounds.   Chest:      Chest wall: No tenderness.   Abdominal:      General: Abdomen is flat.      Palpations: Abdomen is soft.   Musculoskeletal:      Cervical back: Neck supple.      Right lower le+ Edema present.      Left lower le+ Edema present.   Skin:     General: Skin is warm and dry.      Comments: Mild bilateral lower extremity erythema.  There is some scattered red papules with 1 area that has some serous sanguinous drainage   Neurological:      General: No focal deficit present.      Mental Status: He is alert and oriented to person, place, and time. Mental status is at baseline.   Psychiatric:         Mood and Affect: Mood normal.         Behavior: Behavior normal.         Thought Content: Thought content normal.              Result Review  Data Reviewed:{ Labs  Result Review  Imaging  Med Tab  Media :23}   XR Chest 1 View (2022 02:30)  CMP (2022 02:18)  CBC w/diff (2022 02:18)  Adult Transthoracic Echo Complete W/ Cont if Necessary Per Protocol (2021 16:11)  XR Chest PA & Lateral (2022 12:21)  proBNP (2022 11:57)  Magnesium (2022 11:57)  Basic Metabolic Panel (2022 11:57)           Assessment and Plan {CC Problem List  Visit Diagnosis  ROS  Review (Popup)  Health Maintenance  Quality  BestPractice  Medications  SmartSets  SnapShot Encounters  Media :23}   1. Bilateral lower extremity edema  Cellulitis appears to be improving.  He does have some very mild weeping from one area that is likely due to prolonged edema.  No evidence of CHF on exam or recent testing.  We will try changing his Lasix to torsemide 40 mg for 1 week and then decrease to 20 mg daily.  Encouraged him to elevate his lower extremity whenever he is at rest and to wear compression socks      2. Prolonged Q-T interval on ECG  Likely secondary to methadone use.  Improved off of Paxil.  He would like me to prescribe Cymbalta, I have again advised  him that I prefer that his PCP follow-up with this.  We will reach out to his PCP to see if she is willing to refill this     3. Essential hypertension  His carvedilol was increased at last visit.  It is still elevated today.  He does not monitor at home.  We will increase his diuretics as above and recheck at follow-up    4. PND  High suspicion for sleep apnea.  He was referred for sleep study at last office visit but they were unable to get a hold of him.  We provided him with a number today          Follow Up {Instructions Charge Capture  Follow-up Communications :23}   Return in about 19 days (around 7/6/2022) for Office follow up, swelling.    Patient was given instructions and counseling regarding his condition or for health maintenance advice. Please see specific information pulled into the AVS if appropriate.  Advised to call the [facility] with any questions, concerns, or worsening symptoms.     no

## 2022-06-16 NOTE — ASSESSMENT
[FreeTextEntry1] : s/p L RSA, R RSA.\par Xrays 10/19/21 are stable.\par Continue HEP. \par RTO 1-2 years for xrays.\par \par R knee DJD. \par Given R knee cortisone injection 2/19/18 with minimal relief. \par Completed R knee orthovisc # 1 today.\par RTO to continue.\par \par Procedure Note:\par Viscosupplementation Injection: X-ray evidence of Osteoarthritis on this or prior visit and Patient has tried OTC's including aspirin, Ibuprofen, Aleve etc or prescription NSAIDs, and/or exercises at home and/ or physical therapy without satisfactory response. \par \par An injection of Orthovisc 2ml was injected into the right knee. The risks, benefits, and alternatives to Viscosupplementation injection were explained in full to the patient. Risks outlined include but are not limited to infection, sepsis, bleeding, scarring, skin discoloration, temporary increase in pain, syncopal episode, failure to resolve symptoms, allergic reaction, and symptom recurrence. Signs and symptoms of infection reviewed and patient advised to call immediately for redness, fevers, and/or chills. Patient understood the risks. All questions were answered. After discussion of options, patient requested Viscosupplementation. Oral informed consent was obtained and sterile prep of the injection site was performed using alcohol. Sterile technique was utilized for the procedure including the preparation of the solutions used for the injection. Ethyl chloride spray was used topically.  Sterile technique used. Patient tolerated procedure well. Post Procedure Instructions: Patient was advised to call if redness, pain, or fever occur and apply ice for 15 min. out of every hour for the next 12-24 hours as tolerated. patient was advised to rest the joint(s) for 2 days.\par \par Ultrasound Guidance was used for the following reasons: for Glenohumeral injection. \par Ultrasound guided injection was performed of the shoulder, visualization of the needle and placement of injection was performed without complication.

## 2022-06-21 ENCOUNTER — APPOINTMENT (OUTPATIENT)
Dept: ORTHOPEDIC SURGERY | Facility: CLINIC | Age: 82
End: 2022-06-21
Payer: MEDICARE

## 2022-06-21 VITALS — HEIGHT: 58 IN | BODY MASS INDEX: 28.34 KG/M2 | WEIGHT: 135 LBS

## 2022-06-21 PROCEDURE — 20611 DRAIN/INJ JOINT/BURSA W/US: CPT

## 2022-06-21 PROCEDURE — 99212 OFFICE O/P EST SF 10 MIN: CPT | Mod: 25

## 2022-06-21 NOTE — HISTORY OF PRESENT ILLNESS
[8] : 8 [7] : 7 [Sharp] : sharp [Retired] : Work status: retired [de-identified] : DOS: s/p L RSA 12/28/17, s/p R RSA 5/14/18\par \par 6/16/22: Here today for R knee orthovisc #1. \par \par 11/9/21: Here for R knee orthovisc #4.\par \par 11/2/21: Here for R knee orthovisc #3.\par \par 10/26/21: Here for R knee orthovisc #2.\par \par 10/19/21: here for f/u R knee and b/l shoulders. Completed orthovisc R knee 4/13/21. Sustained a fall 1-2 months ago onto R knee inducing pain. S/p b/l RSV 2017 and 2018. Here for annual x-rays, no shoulder pain.\par \par 4/13/21: Here for right knee orthovisc #4.\par \par 3/30/21: Here for right knee orthovisc #3.\par \par 3/23/21: Here for right knee orthovisc #2.\par \par 3/16/21: Follow up right knee. She states orthovisc helped but pain returned. She would like to repeat series. Her CTS is worsening.\par \par 9/15/20: Here for orthovisc #4 right knee.\par \par 9/8/20: Here for orthovisc #3 right knee.\par \par 9/1/20: Here for orthovisc #2 right knee\par \par 8/25/20: Here for orthovisc #1 right knee.\par \par 1/28/20: Here for orthovisc #4 right knee.\par \par 1/21/20: Here for orthovisc #3 right knee. \par \par 1/7/2020- She is for continued orthovisc to the right knee\par \par 12/31/19: Follow up bilateral shoulders and knees. Her shoulders are doing well. Her right knee has become more painful. Orthovisc helped in June.\par \par 7/30/19: Here for f/u s/p bilateral knee orthovisc. She feels some improvement in the left knee, but that is the one which had less pain initially.\par \par 6/18/19: for b/l knees orthovisc injections #4.\par \par 6/11/19: Here for b/l knee orthovisc #3.\par \par 6/4/19: Here for b/l knee orthovisc #2.\par \par 5/28/19: Here for follow up. Her PCP gave her gabapentin to help with nerve pain. She also has been getting diclofenac gel.\par \par 2/19/19: Here for follow up. She just finished her PT yesterday. she still is having issues with the hands. She is also having R > L knee pain, no treatment.\par \par 1/8/19: Here for follow up. She has less motion on the R side. She does has some discomfort with lifting.\par \par 11/20/18: Here for follow up. She feels achiness in both shoulders. The left shoulder she feels popping when reaches behind her back. She still has paresthesias in the hands.\par \par 9/8/18: Here for follow up. She has one more PT for the shoulder. She is in PT for the hands. She did fall off the couch onto the R shoulder this past week.\par \par 8/7/18: Here for follow up. Her shoulders feel good but her R side CTS is still bothering her. She is in PT.\par \par 7/26/18: Here for follow up. She saw Dr. Foster for her CTS.\par \par 6/12/18: Here for f/u swelling. Doppler U/S was neg for DVT. She is having her PT evaluation tonight.\par \par 5/29/18: Here today for suture removal and f/u. She saw Dr. Foster who recommended Hand PT. She c/o pain.\par \par 5/22/18: Here today because she was concerned about swelling and tingling in the index and middle fingers on both hands. She is s/p recent b/l CTR.\par \par 5/8/18:\par MRI R shoulder: Very severe glenohumeral joint osteoarthritis as detailed above, with degenerative absence of the glenoid labrum. Severe long head biceps tendinosis and tenosynovitis. Rotator cuff tendinosis as detailed above, with chronic full-thickness tear of the supraspinatus tendon and multiple focal full-thickness clips in the infraspinatus tendon. Supraspinatus muscle atrophy and edema. Communicating joint and bursal effusions. 1.1 cm osteocartilaginous body in the subscapularis synovial recess.\par \par CT scan R shoulder:\par Very severe glenohumeral joint arthritis. This may be an inflammatory arthropathy with superimposed osteoarthritic changes. Correlate clinically. There is an accompanying large joint effusion with chronic synovitis more evident on the concurrent MR study. See description above. Moderate subacromial/subdeltoid bursitis. Rotator cuff tendinopathy, with supraspinatus and infraspinatus muscle atrophy. Mild qualitative osteoporosis.\par \par 4/10/18: Here for follow up. Her R shoulder is painful. She is scheduled for L CTR 4/11/18 and R CTR 2 weeks later. Requesting to have R TSR. She is very limited with ADL's now, concerned she is putting too much pressure L shoulder to compensate. She has stopped PT, continued HEP. Ibuprofen 800mg has been helping, unable to take until after CTR.\par \par 3/27/18: Here for follow up. Her R shoulder is painful. She is scheduled for b/l CTR, but she wants to move up her surgery date she is not delayed for her R shoulder.\par \par 3/20/18 Here for f/u with her son, now 12 weeks s/p surgery with numbness and tingling/cold sensation left hand. No pain left shoulder post op. Right shoulder pain is worsening. PT stretching and massage helped. She is forced to use arm more now that her right arm is bothering her. Left hand pain limits her.\par \par 2/6/18: Here now 6 weeks s/p surgery with numbness and tingling/cold sensation left hand. No pain left shoulder post op. Right shoulder painful exacerbation. She is starting to use arm a little more at home, but is limited by shooting pain left hand.\par \par 1/9/18: Now about two weeks. Pain improving. She is wearing her sling. Denies fevers, chills.\par \par 12/5/17: Here for follow up recurring bilateral shoulder pain, L>R. Would like to schedule L TSA. Pain is constant and increased, even right side is worse. More "cracking" sensation. Ibuprofen 800mg daily tolerated. Orthovisc did not help.\par \par 11/21/17: Here for follow up. Her shoulders have been causing more pain. She wants to consider replacement.\par \par 7/3/17: Here for bilateral orthovisc #4\par \par 6/27/17: Here for bilateral orthovisc #3\par \par 6/20/17: Here for Orthovisc #2 bilateral shoulders. Worse today because she did gardening yesterday. Pain is intermittent when she increases activity. Ice QHS\par \par 6/13/17: Here today for recurring B/L shoulder pain. L > R today. Flare up last week. Aggravated by gardening. Continues to interfere with ADL's and activity. She is interested in doing the gel injections. Cortisone injection R shoulder was not of much help. She ices frequently, applies Salonpas and takes Tumeric. Ibuprofen 800mg when pain is severe.\par \par 3/7/17: 77 y/o RHD female here for the L shoulder. Initially started with R shoulder pain, as told she had DJD. Tried PT, minimal relief. She also had L shoulder pain. She states the R shoulder bothers her more. Bothers her with most activities but she can manage throughout the day. She has had no other treatment besides Mobic. [] : no [FreeTextEntry1] : R knee

## 2022-06-21 NOTE — ASSESSMENT
[FreeTextEntry1] : s/p L RSA, R RSA.\par Xrays 10/19/21 are stable.\par Continue HEP. \par RTO 1-2 years for xrays.\par \par R knee DJD. \par Given R knee cortisone injection 2/19/18 with minimal relief. \par Completed R knee orthovisc # 2 today.\par RTO to continue.\par \par Procedure Note:\par Viscosupplementation Injection: X-ray evidence of Osteoarthritis on this or prior visit and Patient has tried OTC's including aspirin, Ibuprofen, Aleve etc or prescription NSAIDs, and/or exercises at home and/ or physical therapy without satisfactory response. \par \par An injection of Orthovisc 2ml was injected into the right knee. The risks, benefits, and alternatives to Viscosupplementation injection were explained in full to the patient. Risks outlined include but are not limited to infection, sepsis, bleeding, scarring, skin discoloration, temporary increase in pain, syncopal episode, failure to resolve symptoms, allergic reaction, and symptom recurrence. Signs and symptoms of infection reviewed and patient advised to call immediately for redness, fevers, and/or chills. Patient understood the risks. All questions were answered. After discussion of options, patient requested Viscosupplementation. Oral informed consent was obtained and sterile prep of the injection site was performed using alcohol. Sterile technique was utilized for the procedure including the preparation of the solutions used for the injection. Ethyl chloride spray was used topically.  Sterile technique used. Patient tolerated procedure well. Post Procedure Instructions: Patient was advised to call if redness, pain, or fever occur and apply ice for 15 min. out of every hour for the next 12-24 hours as tolerated. patient was advised to rest the joint(s) for 2 days.\par \par Ultrasound Guidance was used for the following reasons: for erosive arthritis, difficult injection. \par Ultrasound guided injection was performed of the right knee, visualization of the needle and placement of injection was performed without complication.

## 2022-06-28 ENCOUNTER — APPOINTMENT (OUTPATIENT)
Dept: ORTHOPEDIC SURGERY | Facility: CLINIC | Age: 82
End: 2022-06-28

## 2022-06-28 VITALS — HEIGHT: 58 IN | WEIGHT: 135 LBS | BODY MASS INDEX: 28.34 KG/M2

## 2022-06-28 PROCEDURE — 99213 OFFICE O/P EST LOW 20 MIN: CPT | Mod: 25

## 2022-06-28 PROCEDURE — 20611 DRAIN/INJ JOINT/BURSA W/US: CPT

## 2022-06-28 NOTE — HISTORY OF PRESENT ILLNESS
[8] : 8 [7] : 7 [Sharp] : sharp [Retired] : Work status: retired [de-identified] : DOS: s/p L RSA 12/28/17, s/p R RSA 5/14/18\par \par 6/28/22: Here today for R knee orthovisc #3.  She is also complaining of some soreness in the upper arms.  She was doing some yard work recently.\par \par 6/21/22: Here today for R knee orthovisc #2. \par \par 6/16/22: Here today for R knee orthovisc #1. \par \par 11/9/21: Here for R knee orthovisc #4.\par \par 11/2/21: Here for R knee orthovisc #3.\par \par 10/26/21: Here for R knee orthovisc #2.\par \par 10/19/21: here for f/u R knee and b/l shoulders. Completed orthovisc R knee 4/13/21. Sustained a fall 1-2 months ago onto R knee inducing pain. S/p b/l RSV 2017 and 2018. Here for annual x-rays, no shoulder pain.\par \par 4/13/21: Here for right knee orthovisc #4.\par \par 3/30/21: Here for right knee orthovisc #3.\par \par 3/23/21: Here for right knee orthovisc #2.\par \par 3/16/21: Follow up right knee. She states orthovisc helped but pain returned. She would like to repeat series. Her CTS is worsening.\par \par 9/15/20: Here for orthovisc #4 right knee.\par \par 9/8/20: Here for orthovisc #3 right knee.\par \par 9/1/20: Here for orthovisc #2 right knee\par \par 8/25/20: Here for orthovisc #1 right knee.\par \par 1/28/20: Here for orthovisc #4 right knee.\par \par 1/21/20: Here for orthovisc #3 right knee. \par \par 1/7/2020- She is for continued orthovisc to the right knee\par \par 12/31/19: Follow up bilateral shoulders and knees. Her shoulders are doing well. Her right knee has become more painful. Orthovisc helped in June.\par \par 7/30/19: Here for f/u s/p bilateral knee orthovisc. She feels some improvement in the left knee, but that is the one which had less pain initially.\par \par 6/18/19: for b/l knees orthovisc injections #4.\par \par 6/11/19: Here for b/l knee orthovisc #3.\par \par 6/4/19: Here for b/l knee orthovisc #2.\par \par 5/28/19: Here for follow up. Her PCP gave her gabapentin to help with nerve pain. She also has been getting diclofenac gel.\par \par 2/19/19: Here for follow up. She just finished her PT yesterday. she still is having issues with the hands. She is also having R > L knee pain, no treatment.\par \par 1/8/19: Here for follow up. She has less motion on the R side. She does has some discomfort with lifting.\par \par 11/20/18: Here for follow up. She feels achiness in both shoulders. The left shoulder she feels popping when reaches behind her back. She still has paresthesias in the hands.\par \par 9/8/18: Here for follow up. She has one more PT for the shoulder. She is in PT for the hands. She did fall off the couch onto the R shoulder this past week.\par \par 8/7/18: Here for follow up. Her shoulders feel good but her R side CTS is still bothering her. She is in PT.\par \par 7/26/18: Here for follow up. She saw Dr. Foster for her CTS.\par \par 6/12/18: Here for f/u swelling. Doppler U/S was neg for DVT. She is having her PT evaluation tonight.\par \par 5/29/18: Here today for suture removal and f/u. She saw Dr. Foster who recommended Hand PT. She c/o pain.\par \par 5/22/18: Here today because she was concerned about swelling and tingling in the index and middle fingers on both hands. She is s/p recent b/l CTR.\par \par 5/8/18:\par MRI R shoulder: Very severe glenohumeral joint osteoarthritis as detailed above, with degenerative absence of the glenoid labrum. Severe long head biceps tendinosis and tenosynovitis. Rotator cuff tendinosis as detailed above, with chronic full-thickness tear of the supraspinatus tendon and multiple focal full-thickness clips in the infraspinatus tendon. Supraspinatus muscle atrophy and edema. Communicating joint and bursal effusions. 1.1 cm osteocartilaginous body in the subscapularis synovial recess.\par \par CT scan R shoulder:\par Very severe glenohumeral joint arthritis. This may be an inflammatory arthropathy with superimposed osteoarthritic changes. Correlate clinically. There is an accompanying large joint effusion with chronic synovitis more evident on the concurrent MR study. See description above. Moderate subacromial/subdeltoid bursitis. Rotator cuff tendinopathy, with supraspinatus and infraspinatus muscle atrophy. Mild qualitative osteoporosis.\par \par 4/10/18: Here for follow up. Her R shoulder is painful. She is scheduled for L CTR 4/11/18 and R CTR 2 weeks later. Requesting to have R TSR. She is very limited with ADL's now, concerned she is putting too much pressure L shoulder to compensate. She has stopped PT, continued HEP. Ibuprofen 800mg has been helping, unable to take until after CTR.\par \par 3/27/18: Here for follow up. Her R shoulder is painful. She is scheduled for b/l CTR, but she wants to move up her surgery date she is not delayed for her R shoulder.\par \par 3/20/18 Here for f/u with her son, now 12 weeks s/p surgery with numbness and tingling/cold sensation left hand. No pain left shoulder post op. Right shoulder pain is worsening. PT stretching and massage helped. She is forced to use arm more now that her right arm is bothering her. Left hand pain limits her.\par \par 2/6/18: Here now 6 weeks s/p surgery with numbness and tingling/cold sensation left hand. No pain left shoulder post op. Right shoulder painful exacerbation. She is starting to use arm a little more at home, but is limited by shooting pain left hand.\par \par 1/9/18: Now about two weeks. Pain improving. She is wearing her sling. Denies fevers, chills.\par \par 12/5/17: Here for follow up recurring bilateral shoulder pain, L>R. Would like to schedule L TSA. Pain is constant and increased, even right side is worse. More "cracking" sensation. Ibuprofen 800mg daily tolerated. Orthovisc did not help.\par \par 11/21/17: Here for follow up. Her shoulders have been causing more pain. She wants to consider replacement.\par \par 7/3/17: Here for bilateral orthovisc #4\par \par 6/27/17: Here for bilateral orthovisc #3\par \par 6/20/17: Here for Orthovisc #2 bilateral shoulders. Worse today because she did gardening yesterday. Pain is intermittent when she increases activity. Ice QHS\par \par 6/13/17: Here today for recurring B/L shoulder pain. L > R today. Flare up last week. Aggravated by gardening. Continues to interfere with ADL's and activity. She is interested in doing the gel injections. Cortisone injection R shoulder was not of much help. She ices frequently, applies Salonpas and takes Tumeric. Ibuprofen 800mg when pain is severe.\par \par 3/7/17: 77 y/o RHD female here for the L shoulder. Initially started with R shoulder pain, as told she had DJD. Tried PT, minimal relief. She also had L shoulder pain. She states the R shoulder bothers her more. Bothers her with most activities but she can manage throughout the day. She has had no other treatment besides Mobic. [] : no [FreeTextEntry1] : R knee

## 2022-06-28 NOTE — ASSESSMENT
[FreeTextEntry1] : s/p L RSA, R RSA.\par Xrays 10/19/21 are stable.\par Continue HEP. \par RTO 1-2 years for xrays.\par \par R knee DJD. \par Given R knee cortisone injection 2/19/18 with minimal relief. \par Completed R knee orthovisc # 3 today.\par RTO to continue.\par \par Procedure Note:\par Viscosupplementation Injection: X-ray evidence of Osteoarthritis on this or prior visit and Patient has tried OTC's including aspirin, Ibuprofen, Aleve etc or prescription NSAIDs, and/or exercises at home and/ or physical therapy without satisfactory response. \par \par An injection of Orthovisc 2ml was injected into the right knee. The risks, benefits, and alternatives to Viscosupplementation injection were explained in full to the patient. Risks outlined include but are not limited to infection, sepsis, bleeding, scarring, skin discoloration, temporary increase in pain, syncopal episode, failure to resolve symptoms, allergic reaction, and symptom recurrence. Signs and symptoms of infection reviewed and patient advised to call immediately for redness, fevers, and/or chills. Patient understood the risks. All questions were answered. After discussion of options, patient requested Viscosupplementation. Oral informed consent was obtained and sterile prep of the injection site was performed using alcohol. Sterile technique was utilized for the procedure including the preparation of the solutions used for the injection. Ethyl chloride spray was used topically.  Sterile technique used. Patient tolerated procedure well. Post Procedure Instructions: Patient was advised to call if redness, pain, or fever occur and apply ice for 15 min. out of every hour for the next 12-24 hours as tolerated. patient was advised to rest the joint(s) for 2 days.\par \par Ultrasound Guidance was used for the following reasons: for erosive arthritis, difficult injection. \par Ultrasound guided injection was performed of the right knee, visualization of the needle and placement of injection was performed without complication.

## 2022-07-05 ENCOUNTER — APPOINTMENT (OUTPATIENT)
Dept: ORTHOPEDIC SURGERY | Facility: CLINIC | Age: 82
End: 2022-07-05

## 2022-07-05 VITALS — BODY MASS INDEX: 28.34 KG/M2 | HEIGHT: 58 IN | WEIGHT: 135 LBS

## 2022-07-05 PROCEDURE — 99212 OFFICE O/P EST SF 10 MIN: CPT | Mod: 25

## 2022-07-05 PROCEDURE — 20611 DRAIN/INJ JOINT/BURSA W/US: CPT

## 2022-07-05 NOTE — HISTORY OF PRESENT ILLNESS
[8] : 8 [7] : 7 [Sharp] : sharp [Retired] : Work status: retired [de-identified] : DOS: s/p L RSA 12/28/17, s/p R RSA 5/14/18\par \par 7/5/22: Here for orthovisc #4 right knee. \par \par 6/28/22: Here today for R knee orthovisc #3.  She is also complaining of some soreness in the upper arms.  She was doing some yard work recently.\par \par 6/21/22: Here today for R knee orthovisc #2. \par \par 6/16/22: Here today for R knee orthovisc #1. \par \par 11/9/21: Here for R knee orthovisc #4.\par \par 11/2/21: Here for R knee orthovisc #3.\par \par 10/26/21: Here for R knee orthovisc #2.\par \par 10/19/21: here for f/u R knee and b/l shoulders. Completed orthovisc R knee 4/13/21. Sustained a fall 1-2 months ago onto R knee inducing pain. S/p b/l RSV 2017 and 2018. Here for annual x-rays, no shoulder pain.\par \par 4/13/21: Here for right knee orthovisc #4.\par \par 3/30/21: Here for right knee orthovisc #3.\par \par 3/23/21: Here for right knee orthovisc #2.\par \par 3/16/21: Follow up right knee. She states orthovisc helped but pain returned. She would like to repeat series. Her CTS is worsening.\par \par 9/15/20: Here for orthovisc #4 right knee.\par \par 9/8/20: Here for orthovisc #3 right knee.\par \par 9/1/20: Here for orthovisc #2 right knee\par \par 8/25/20: Here for orthovisc #1 right knee.\par \par 1/28/20: Here for orthovisc #4 right knee.\par \par 1/21/20: Here for orthovisc #3 right knee. \par \par 1/7/2020- She is for continued orthovisc to the right knee\par \par 12/31/19: Follow up bilateral shoulders and knees. Her shoulders are doing well. Her right knee has become more painful. Orthovisc helped in June.\par \par 7/30/19: Here for f/u s/p bilateral knee orthovisc. She feels some improvement in the left knee, but that is the one which had less pain initially.\par \par 6/18/19: for b/l knees orthovisc injections #4.\par \par 6/11/19: Here for b/l knee orthovisc #3.\par \par 6/4/19: Here for b/l knee orthovisc #2.\par \par 5/28/19: Here for follow up. Her PCP gave her gabapentin to help with nerve pain. She also has been getting diclofenac gel.\par \par 2/19/19: Here for follow up. She just finished her PT yesterday. she still is having issues with the hands. She is also having R > L knee pain, no treatment.\par \par 1/8/19: Here for follow up. She has less motion on the R side. She does has some discomfort with lifting.\par \par 11/20/18: Here for follow up. She feels achiness in both shoulders. The left shoulder she feels popping when reaches behind her back. She still has paresthesias in the hands.\par \par 9/8/18: Here for follow up. She has one more PT for the shoulder. She is in PT for the hands. She did fall off the couch onto the R shoulder this past week.\par \par 8/7/18: Here for follow up. Her shoulders feel good but her R side CTS is still bothering her. She is in PT.\par \par 7/26/18: Here for follow up. She saw Dr. Foster for her CTS.\par \par 6/12/18: Here for f/u swelling. Doppler U/S was neg for DVT. She is having her PT evaluation tonight.\par \par 5/29/18: Here today for suture removal and f/u. She saw Dr. Foster who recommended Hand PT. She c/o pain.\par \par 5/22/18: Here today because she was concerned about swelling and tingling in the index and middle fingers on both hands. She is s/p recent b/l CTR.\par \par 5/8/18:\par MRI R shoulder: Very severe glenohumeral joint osteoarthritis as detailed above, with degenerative absence of the glenoid labrum. Severe long head biceps tendinosis and tenosynovitis. Rotator cuff tendinosis as detailed above, with chronic full-thickness tear of the supraspinatus tendon and multiple focal full-thickness clips in the infraspinatus tendon. Supraspinatus muscle atrophy and edema. Communicating joint and bursal effusions. 1.1 cm osteocartilaginous body in the subscapularis synovial recess.\par \par CT scan R shoulder:\par Very severe glenohumeral joint arthritis. This may be an inflammatory arthropathy with superimposed osteoarthritic changes. Correlate clinically. There is an accompanying large joint effusion with chronic synovitis more evident on the concurrent MR study. See description above. Moderate subacromial/subdeltoid bursitis. Rotator cuff tendinopathy, with supraspinatus and infraspinatus muscle atrophy. Mild qualitative osteoporosis.\par \par 4/10/18: Here for follow up. Her R shoulder is painful. She is scheduled for L CTR 4/11/18 and R CTR 2 weeks later. Requesting to have R TSR. She is very limited with ADL's now, concerned she is putting too much pressure L shoulder to compensate. She has stopped PT, continued HEP. Ibuprofen 800mg has been helping, unable to take until after CTR.\par \par 3/27/18: Here for follow up. Her R shoulder is painful. She is scheduled for b/l CTR, but she wants to move up her surgery date she is not delayed for her R shoulder.\par \par 3/20/18 Here for f/u with her son, now 12 weeks s/p surgery with numbness and tingling/cold sensation left hand. No pain left shoulder post op. Right shoulder pain is worsening. PT stretching and massage helped. She is forced to use arm more now that her right arm is bothering her. Left hand pain limits her.\par \par 2/6/18: Here now 6 weeks s/p surgery with numbness and tingling/cold sensation left hand. No pain left shoulder post op. Right shoulder painful exacerbation. She is starting to use arm a little more at home, but is limited by shooting pain left hand.\par \par 1/9/18: Now about two weeks. Pain improving. She is wearing her sling. Denies fevers, chills.\par \par 12/5/17: Here for follow up recurring bilateral shoulder pain, L>R. Would like to schedule L TSA. Pain is constant and increased, even right side is worse. More "cracking" sensation. Ibuprofen 800mg daily tolerated. Orthovisc did not help.\par \par 11/21/17: Here for follow up. Her shoulders have been causing more pain. She wants to consider replacement.\par \par 7/3/17: Here for bilateral orthovisc #4\par \par 6/27/17: Here for bilateral orthovisc #3\par \par 6/20/17: Here for Orthovisc #2 bilateral shoulders. Worse today because she did gardening yesterday. Pain is intermittent when she increases activity. Ice QHS\par \par 6/13/17: Here today for recurring B/L shoulder pain. L > R today. Flare up last week. Aggravated by gardening. Continues to interfere with ADL's and activity. She is interested in doing the gel injections. Cortisone injection R shoulder was not of much help. She ices frequently, applies Salonpas and takes Tumeric. Ibuprofen 800mg when pain is severe.\par \par 3/7/17: 77 y/o RHD female here for the L shoulder. Initially started with R shoulder pain, as told she had DJD. Tried PT, minimal relief. She also had L shoulder pain. She states the R shoulder bothers her more. Bothers her with most activities but she can manage throughout the day. She has had no other treatment besides Mobic. [] : no [FreeTextEntry1] : R knee

## 2022-07-05 NOTE — ASSESSMENT
[FreeTextEntry1] : s/p L RSA, R RSA.\par Xrays 10/19/21 are stable.\par Continue HEP. \par RTO 1-2 years for xrays.\par \par R knee DJD. \par Given R knee cortisone injection 2/19/18 with minimal relief. \par Completed R knee orthovisc # 4 today.\par RTO in 6 weeks prn.\par \par Procedure Note:\par Viscosupplementation Injection: X-ray evidence of Osteoarthritis on this or prior visit and Patient has tried OTC's including aspirin, Ibuprofen, Aleve etc or prescription NSAIDs, and/or exercises at home and/ or physical therapy without satisfactory response. \par \par An injection of Orthovisc 2ml was injected into the right knee. The risks, benefits, and alternatives to Viscosupplementation injection were explained in full to the patient. Risks outlined include but are not limited to infection, sepsis, bleeding, scarring, skin discoloration, temporary increase in pain, syncopal episode, failure to resolve symptoms, allergic reaction, and symptom recurrence. Signs and symptoms of infection reviewed and patient advised to call immediately for redness, fevers, and/or chills. Patient understood the risks. All questions were answered. After discussion of options, patient requested Viscosupplementation. Oral informed consent was obtained and sterile prep of the injection site was performed using alcohol. Sterile technique was utilized for the procedure including the preparation of the solutions used for the injection. Ethyl chloride spray was used topically.  Sterile technique used. Patient tolerated procedure well. Post Procedure Instructions: Patient was advised to call if redness, pain, or fever occur and apply ice for 15 min. out of every hour for the next 12-24 hours as tolerated. patient was advised to rest the joint(s) for 2 days.\par \par Ultrasound Guidance was used for the following reasons: for erosive arthritis, difficult injection. \par Ultrasound guided injection was performed of the right knee, visualization of the needle and placement of injection was performed without complication.

## 2022-10-24 ENCOUNTER — APPOINTMENT (OUTPATIENT)
Dept: SURGERY | Facility: CLINIC | Age: 82
End: 2022-10-24

## 2022-10-24 VITALS
BODY MASS INDEX: 28.34 KG/M2 | OXYGEN SATURATION: 96 % | SYSTOLIC BLOOD PRESSURE: 163 MMHG | HEIGHT: 58 IN | WEIGHT: 135 LBS | HEART RATE: 93 BPM | TEMPERATURE: 97.3 F | DIASTOLIC BLOOD PRESSURE: 79 MMHG

## 2022-10-24 PROCEDURE — 99213 OFFICE O/P EST LOW 20 MIN: CPT

## 2022-10-24 NOTE — CONSULT LETTER
[Dear  ___] : Dear  [unfilled], [Sincerely,] : Sincerely, [DrFidel  ___] : Dr. VO [FreeTextEntry2] : Infiltrating ductal carcinoma, well differentiated, right breast.  \par T1 N0 M0\par Positive/RI positive\par Status post right lumpectomy with sentinel lymph node biopsy April 2, 2003.\par Status post radiation treatment.\par Status post Arimidex.\par Strong family history of breast carcinoma being diagnosed in her sister. [FreeTextEntry1] : \par I saw Kristen Fan in the office today for a breast evaluation.  She is without any complaints in regards to her breasts.  She has a strong family history of breast carcinoma being diagnosed in her sister.  She had a benign bilateral mammogram and bilateral breast ultrasound November 8, 2021.\par \par Her medical and surgical history were reviewed.  Her medications were reviewed.  A review of systems was performed and documented.\par \par On Physical Exam:  General: No acute distress, conversant, and well-nourished.  Respiratory: Lungs are clear to auscultation with good inspiratory effort.  Cardiovascular: Heart is regular rate and rhythm with no murmurs.  Abdomen: Soft and nontender.  Skin: Good color, turgor, texture with no gross lesions.  Psychiatric: Awake, alert and oriented x3 with an appropriate affect.\par \par Pertinent physical findings: She has post operative and post radiation changes to the right breast.  Her breasts otherwise reveal no masses, skin changes, axillary, or supraclavicular lymphadenopathy.  Her nipples everted bilaterally.  Her neck is supple.\par \par Impression: Status post right breast carcinoma, strong family history of breast carcinoma.  Benign bilateral mammogram and bilateral breast ultrasound November 8, 2021.\par \par Plan: Bilateral mammogram and bilateral breast ultrasound in November.  Pending normalcy of these studies to follow-up in 6 months.  Since her sister did succumbed to breast cancer many years ago we did discuss genetic testing which she wishes to think about. [FreeTextEntry3] : Chevy Linares D.O., DONAVON, FACS\par , Surgery [facility]\par  Surgery [facility]

## 2022-11-14 NOTE — BRIEF OPERATIVE NOTE - PROCEDURE
No Shoulder arthroplasty, left  12/28/2017  S/P left reverse shoulder arthroplasty  Active  JORGE <<-----Click on this checkbox to enter Procedure

## 2023-01-26 NOTE — ASU PREOP CHECKLIST - HEIGHT IN INCHES
Clinical Level of Care Assessment    Outpatient Ostomy Care      NAME:  Rian Pereira OF BIRTH:  1947  MEDICAL RECORD NUMBER:  [id number]   DATE:  1/26/2023      Patient Jaay Cervantes Assessment- Document in Flowsheet I&O   Points   Review of chart []   0   Assess Complete Ostomy tab in Navigator for assessment of; stoma status, peristomal skin, presence of hernia/stool consistency/diet/related medications   Simple adjustments to pouch size/pouch system, new stoma pattern, accessory addition/deletion. []   1   Assess Complete Ostomy tab in Navigator for assessment of; stoma status, peristomal skin, presence of hernia/stool consistency/diet/related medications   Moderate adjustments to pouch size/pouch system, new stoma pattern, accessory addition/deletion. Observe patient/caregiver with hands-on care. 1-2 adjustments to pouch size/system/skin care/accessory addition or deletion. [x]   2   Assess Complete Ostomy tab in Navigator for assessment of; stoma status, peristomal skin, presence of hernia/stool consistency/diet/related medications   Complex adjustments to pouch size/pouch system, new stoma pattern, accessory addition/deletion. 3 or more complex adjustments to pouch size/system/skin care/accessory addition or deletion. Observe patient/caregiver with hands-on care. Assess patient/patient abdomen for optimal pre-marked stoma site. Assess patient abdomen for type of hernia belt/accessory needed. []   3         Ambulation Status Documented in [facility] Clinical Note  Status Definition Points   Independent Independently able to ambulate. Fully able (without any assistance) to get on/off exam table/chair. [x]   0   Minimal Physical Assistance Requires physical assistance of one person to ambulate and/or position patient to be examined. Includes necessary physical assistance to position lower extremities on/off stool.    []   1   Moderate Physical Assistance Requires at least one staff member to physically assist patient in ambulating into treatment room, and/or on off chair/bed. Requires assistance to bathroom. []   2   Full Assistance Requires assistance of at least two staff members to transfer patient into treatment room and/or on/off bed/chair. \"Total Transfer\". Unable to use bathroom requires bedside commode and/or bedpan []   3       Teaching Effort Documented in [facility] Clinical Note  Effort Definition Points   No Teaching  []   0   General Initial/Simple lesson:  Assess readiness to learn, assess patient learning style to determine educational flow/special needs for learning. Teaching related to 1-3 topics  Documentation in [facility] completed. [x]   1   Intermediate Assess readiness to learn, assess patient learning style to determine educational flow/special needs for learning. Teaching related to 3-4 topics. Hernia belt application and care considerations  Documentation in [facility] completed. []   2   Complex Assess readiness to learn, assess patient learning style to determine educational flow/special needs for learning. Teaching of greater than 5 additional topics   Pre-operative ostomy education with review of written resources for patient/family/caregiver as needed. Demonstration/return demonstration of ostomy irrigation  Documentation in [facility] completed. []   3     Patient Assessment and Planning in [facility] Clinical Note   Planning Definition Points   Simple Simple pouch change procedure completed and reviewed with patient/family/caregiver   Documentation in [facility] completed. []   1   Intermediate Moderate level of follow-up needs:   Pouch change/discharge procedure revised and reviewed with patient/caregiver. Communications with outside resources; i.e. Telephone calls to Surgeon/ PCP, family/caregiver, home health, ECF. Documentation in [facility] completed.      [x]   2   Complex Complex level of instructions/changes:   Family/Caregiver learning/demonstration/return demonstration visit. Pouching/discharge procedure revised/reviewed with patient/family/caregiver. Contact with outside resources; i.e. communication with Surgeon/ PCP, home health, ECF. Contact/referral to ostomy appliance supplier for new or additional products. Review when to call [facility] or schedule follow-up visit. Referral to Emergency Department   Documentation in [facility] completed. []   3       Is this the Patient's First Visit with [facility] @ [facility]? No    Is this Patient Established to this [facility] within the last 3 years?    Yes             Clinical Level of Care      Points  0-3  Level 1 []     Points  4-6  Level 2 [x]     Points  7-8  Level 3 []     Points  9-10  Level 4 []     Points  11-12  Level 5 []       Electronically signed by Raissa Wang RN on 1/26/2023 at 1:09 PM 10

## 2023-04-17 ENCOUNTER — APPOINTMENT (OUTPATIENT)
Dept: SURGERY | Facility: CLINIC | Age: 83
End: 2023-04-17
Payer: MEDICARE

## 2023-04-17 VITALS
HEART RATE: 72 BPM | BODY MASS INDEX: 28.34 KG/M2 | DIASTOLIC BLOOD PRESSURE: 71 MMHG | WEIGHT: 135 LBS | HEIGHT: 58 IN | TEMPERATURE: 97 F | OXYGEN SATURATION: 96 % | SYSTOLIC BLOOD PRESSURE: 147 MMHG

## 2023-04-17 PROCEDURE — 99213 OFFICE O/P EST LOW 20 MIN: CPT

## 2023-04-18 NOTE — CONSULT LETTER
[Dear  ___] : Dear  [unfilled], [Sincerely,] : Sincerely, [DrFidel  ___] : Dr. VO [FreeTextEntry1] : \par I saw Kristen Fan in the office today for a breast evaluation.  She is without any complaints in regards to her breasts.  She has a strong family history of breast carcinoma being diagnosed in her sister.  She had a benign bilateral mammogram and bilateral breast ultrasound November 18, 2022.\par \par Her medical and surgical history were reviewed.  Her medications were reviewed.  A review of systems was performed and documented.\par \par On Physical Exam:  General: No acute distress, conversant, and well-nourished.  Respiratory: Lungs are clear to auscultation with good inspiratory effort.  Cardiovascular: Heart is regular rate and rhythm with no murmurs.  Abdomen: Soft and nontender.  Skin: Good color, turgor, texture with no gross lesions.  Psychiatric: Awake, alert and oriented x3 with an appropriate affect.\par \par Pertinent physical findings: She has post operative and post radiation changes to the right breast.  Her breasts otherwise reveal no masses, skin changes, axillary, or supraclavicular lymphadenopathy.  Her nipples everted bilaterally.  Her neck is supple.\par \par Impression: Status post right breast carcinoma, strong family history of breast carcinoma.  Benign bilateral mammogram and bilateral breast ultrasound November 18, 2022.\par \par Plan: Follow-up in 6 months. [FreeTextEntry2] : Infiltrating ductal carcinoma, well differentiated, right breast.  \par T1 N0 M0\par ER positive/VT positive\par Status post right lumpectomy with sentinel lymph node biopsy April 2, 2003.\par Status post radiation treatment.\par Status post Arimidex.\par Strong family history of breast carcinoma being diagnosed in her sister. [FreeTextEntry3] : Chevy Linares D.O., DONAVON, FACS\par , Surgery [facility]\par  Surgery [facility]

## 2023-06-06 NOTE — PATIENT PROFILE ADULT. - SOCIAL CONCERNS
Quality 111:Pneumonia Vaccination Status For Older Adults: Patient received any pneumococcal conjugate or polysaccharide vaccine on or after their 60th birthday and before the end of the measurement period
Quality 226: Preventive Care And Screening: Tobacco Use: Screening And Cessation Intervention: Patient screened for tobacco use and is an ex/non-smoker
Detail Level: Detailed
Quality 110: Preventive Care And Screening: Influenza Immunization: Influenza Immunization previously received during influenza season
None

## 2023-07-18 ENCOUNTER — APPOINTMENT (OUTPATIENT)
Dept: ORTHOPEDIC SURGERY | Facility: CLINIC | Age: 83
End: 2023-07-18
Payer: MEDICARE

## 2023-07-18 VITALS — BODY MASS INDEX: 28.34 KG/M2 | HEIGHT: 58 IN | WEIGHT: 135 LBS

## 2023-07-18 DIAGNOSIS — Z98.890 OTHER SPECIFIED POSTPROCEDURAL STATES: ICD-10-CM

## 2023-07-18 PROCEDURE — 99214 OFFICE O/P EST MOD 30 MIN: CPT | Mod: 25

## 2023-07-18 PROCEDURE — 20610 DRAIN/INJ JOINT/BURSA W/O US: CPT | Mod: RT

## 2023-07-18 PROCEDURE — 73010 X-RAY EXAM OF SHOULDER BLADE: CPT | Mod: 50

## 2023-07-18 PROCEDURE — 73030 X-RAY EXAM OF SHOULDER: CPT | Mod: 50

## 2023-07-18 NOTE — ASSESSMENT
[FreeTextEntry1] : s/p L RSA, R RSA.\par Xrays 10/19/21 are stable.\par Continue HEP. \par RTO 1-2 years for xrays.\par \par R knee DJD. \par Given R knee cortisone injection 2/19/18 with minimal relief. \par Completed R knee orthovisc #1 today.\par RTO to continue. \par \par \par Procedure Note:\par Viscosupplementation Injection: X-ray evidence of Osteoarthritis on this or prior visit and Patient has tried OTC's including aspirin, Ibuprofen, Aleve etc or prescription NSAIDs, and/or exercises at home and/ or physical therapy without satisfactory response. \par \par An injection of Orthovisc 2ml was injected into the right knee. The risks, benefits, and alternatives to Viscosupplementation injection were explained in full to the patient. Risks outlined include but are not limited to infection, sepsis, bleeding, scarring, skin discoloration, temporary increase in pain, syncopal episode, failure to resolve symptoms, allergic reaction, and symptom recurrence. Signs and symptoms of infection reviewed and patient advised to call immediately for redness, fevers, and/or chills. Patient understood the risks. All questions were answered. After discussion of options, patient requested Viscosupplementation. Oral informed consent was obtained and sterile prep of the injection site was performed using alcohol. Sterile technique was utilized for the procedure including the preparation of the solutions used for the injection. Ethyl chloride spray was used topically.  Sterile technique used. Patient tolerated procedure well. Post Procedure Instructions: Patient was advised to call if redness, pain, or fever occur and apply ice for 15 min. out of every hour for the next 12-24 hours as tolerated. patient was advised to rest the joint(s) for 2 days.

## 2023-07-18 NOTE — HISTORY OF PRESENT ILLNESS
[8] : 8 [7] : 7 [Sharp] : sharp [Retired] : Work status: retired [de-identified] : DOS: s/p L RSA 12/28/17, s/p R RSA 5/14/18\par \par 7/18/23:   Here for follow up.  Her right knee pain has returned.  Her shoulders are doing well. \par \par 7/5/22: Here for orthovisc #4 right knee. \par \par 6/28/22: Here today for R knee orthovisc #3.  She is also complaining of some soreness in the upper arms.  She was doing some yard work recently.\par \par 6/21/22: Here today for R knee orthovisc #2. \par \par 6/16/22: Here today for R knee orthovisc #1. \par \par 11/9/21: Here for R knee orthovisc #4.\par \par 11/2/21: Here for R knee orthovisc #3.\par \par 10/26/21: Here for R knee orthovisc #2.\par \par 10/19/21: here for f/u R knee and b/l shoulders. Completed orthovisc R knee 4/13/21. Sustained a fall 1-2 months ago onto R knee inducing pain. S/p b/l RSV 2017 and 2018. Here for annual x-rays, no shoulder pain.\par \par 4/13/21: Here for right knee orthovisc #4.\par \par 3/30/21: Here for right knee orthovisc #3.\par \par 3/23/21: Here for right knee orthovisc #2.\par \par 3/16/21: Follow up right knee. She states orthovisc helped but pain returned. She would like to repeat series. Her CTS is worsening.\par \par 9/15/20: Here for orthovisc #4 right knee.\par \par 9/8/20: Here for orthovisc #3 right knee.\par \par 9/1/20: Here for orthovisc #2 right knee\par \par 8/25/20: Here for orthovisc #1 right knee.\par \par 1/28/20: Here for orthovisc #4 right knee.\par \par 1/21/20: Here for orthovisc #3 right knee. \par \par 1/7/2020- She is for continued orthovisc to the right knee\par \par 12/31/19: Follow up bilateral shoulders and knees. Her shoulders are doing well. Her right knee has become more painful. Orthovisc helped in June.\par \par 7/30/19: Here for f/u s/p bilateral knee orthovisc. She feels some improvement in the left knee, but that is the one which had less pain initially.\par \par 6/18/19: for b/l knees orthovisc injections #4.\par \par 6/11/19: Here for b/l knee orthovisc #3.\par \par 6/4/19: Here for b/l knee orthovisc #2.\par \par 5/28/19: Here for follow up. Her PCP gave her gabapentin to help with nerve pain. She also has been getting diclofenac gel.\par \par 2/19/19: Here for follow up. She just finished her PT yesterday. she still is having issues with the hands. She is also having R > L knee pain, no treatment.\par \par 1/8/19: Here for follow up. She has less motion on the R side. She does has some discomfort with lifting.\par \par 11/20/18: Here for follow up. She feels achiness in both shoulders. The left shoulder she feels popping when reaches behind her back. She still has paresthesias in the hands.\par \par 9/8/18: Here for follow up. She has one more PT for the shoulder. She is in PT for the hands. She did fall off the couch onto the R shoulder this past week.\par \par 8/7/18: Here for follow up. Her shoulders feel good but her R side CTS is still bothering her. She is in PT.\par \par 7/26/18: Here for follow up. She saw Dr. Foster for her CTS.\par \par 6/12/18: Here for f/u swelling. Doppler U/S was neg for DVT. She is having her PT evaluation tonight.\par \par 5/29/18: Here today for suture removal and f/u. She saw Dr. Foster who recommended Hand PT. She c/o pain.\par \par 5/22/18: Here today because she was concerned about swelling and tingling in the index and middle fingers on both hands. She is s/p recent b/l CTR.\par \par 5/8/18:\par MRI R shoulder: Very severe glenohumeral joint osteoarthritis as detailed above, with degenerative absence of the glenoid labrum. Severe long head biceps tendinosis and tenosynovitis. Rotator cuff tendinosis as detailed above, with chronic full-thickness tear of the supraspinatus tendon and multiple focal full-thickness clips in the infraspinatus tendon. Supraspinatus muscle atrophy and edema. Communicating joint and bursal effusions. 1.1 cm osteocartilaginous body in the subscapularis synovial recess.\par \par CT scan R shoulder:\par Very severe glenohumeral joint arthritis. This may be an inflammatory arthropathy with superimposed osteoarthritic changes. Correlate clinically. There is an accompanying large joint effusion with chronic synovitis more evident on the concurrent MR study. See description above. Moderate subacromial/subdeltoid bursitis. Rotator cuff tendinopathy, with supraspinatus and infraspinatus muscle atrophy. Mild qualitative osteoporosis.\par \par 4/10/18: Here for follow up. Her R shoulder is painful. She is scheduled for L CTR 4/11/18 and R CTR 2 weeks later. Requesting to have R TSR. She is very limited with ADL's now, concerned she is putting too much pressure L shoulder to compensate. She has stopped PT, continued HEP. Ibuprofen 800mg has been helping, unable to take until after CTR.\par \par 3/27/18: Here for follow up. Her R shoulder is painful. She is scheduled for b/l CTR, but she wants to move up her surgery date she is not delayed for her R shoulder.\par \par 3/20/18 Here for f/u with her son, now 12 weeks s/p surgery with numbness and tingling/cold sensation left hand. No pain left shoulder post op. Right shoulder pain is worsening. PT stretching and massage helped. She is forced to use arm more now that her right arm is bothering her. Left hand pain limits her.\par \par 2/6/18: Here now 6 weeks s/p surgery with numbness and tingling/cold sensation left hand. No pain left shoulder post op. Right shoulder painful exacerbation. She is starting to use arm a little more at home, but is limited by shooting pain left hand.\par \par 1/9/18: Now about two weeks. Pain improving. She is wearing her sling. Denies fevers, chills.\par \par 12/5/17: Here for follow up recurring bilateral shoulder pain, L>R. Would like to schedule L TSA. Pain is constant and increased, even right side is worse. More "cracking" sensation. Ibuprofen 800mg daily tolerated. Orthovisc did not help.\par \par 11/21/17: Here for follow up. Her shoulders have been causing more pain. She wants to consider replacement.\par \par 7/3/17: Here for bilateral orthovisc #4\par \par 6/27/17: Here for bilateral orthovisc #3\par \par 6/20/17: Here for Orthovisc #2 bilateral shoulders. Worse today because she did gardening yesterday. Pain is intermittent when she increases activity. Ice QHS\par \par 6/13/17: Here today for recurring B/L shoulder pain. L > R today. Flare up last week. Aggravated by gardening. Continues to interfere with ADL's and activity. She is interested in doing the gel injections. Cortisone injection R shoulder was not of much help. She ices frequently, applies Salonpas and takes Tumeric. Ibuprofen 800mg when pain is severe.\par \par 3/7/17: 77 y/o RHD female here for the L shoulder. Initially started with R shoulder pain, as told she had DJD. Tried PT, minimal relief. She also had L shoulder pain. She states the R shoulder bothers her more. Bothers her with most activities but she can manage throughout the day. She has had no other treatment besides Mobic. [] : no [FreeTextEntry1] : R knee

## 2023-07-18 NOTE — IMAGING
[Bilateral] : shoulder bilaterally [Components well fixed, in good position] : Components well fixed, in good position

## 2023-07-25 ENCOUNTER — APPOINTMENT (OUTPATIENT)
Dept: ORTHOPEDIC SURGERY | Facility: CLINIC | Age: 83
End: 2023-07-25
Payer: MEDICARE

## 2023-07-25 VITALS — BODY MASS INDEX: 28.34 KG/M2 | HEIGHT: 58 IN | WEIGHT: 135 LBS

## 2023-07-25 PROCEDURE — 20611 DRAIN/INJ JOINT/BURSA W/US: CPT | Mod: RT

## 2023-07-25 NOTE — HISTORY OF PRESENT ILLNESS
[8] : 8 [7] : 7 [Sharp] : sharp [Retired] : Work status: retired [de-identified] : DOS: s/p L RSA 12/28/17, s/p R RSA 5/14/18\par \par 7/25/23: Here for R knee orthovisc #2.\par \par 7/18/23:   Here for follow up.  Her right knee pain has returned.  Her shoulders are doing well. \par \par 7/5/22: Here for orthovisc #4 right knee. \par \par 6/28/22: Here today for R knee orthovisc #3.  She is also complaining of some soreness in the upper arms.  She was doing some yard work recently.\par \par 6/21/22: Here today for R knee orthovisc #2. \par \par 6/16/22: Here today for R knee orthovisc #1. \par \par 11/9/21: Here for R knee orthovisc #4.\par \par 11/2/21: Here for R knee orthovisc #3.\par \par 10/26/21: Here for R knee orthovisc #2.\par \par 10/19/21: here for f/u R knee and b/l shoulders. Completed orthovisc R knee 4/13/21. Sustained a fall 1-2 months ago onto R knee inducing pain. S/p b/l RSV 2017 and 2018. Here for annual x-rays, no shoulder pain.\par \par 4/13/21: Here for right knee orthovisc #4.\par \par 3/30/21: Here for right knee orthovisc #3.\par \par 3/23/21: Here for right knee orthovisc #2.\par \par 3/16/21: Follow up right knee. She states orthovisc helped but pain returned. She would like to repeat series. Her CTS is worsening.\par \par 9/15/20: Here for orthovisc #4 right knee.\par \par 9/8/20: Here for orthovisc #3 right knee.\par \par 9/1/20: Here for orthovisc #2 right knee\par \par 8/25/20: Here for orthovisc #1 right knee.\par \par 1/28/20: Here for orthovisc #4 right knee.\par \par 1/21/20: Here for orthovisc #3 right knee. \par \par 1/7/2020- She is for continued orthovisc to the right knee\par \par 12/31/19: Follow up bilateral shoulders and knees. Her shoulders are doing well. Her right knee has become more painful. Orthovisc helped in June.\par \par 7/30/19: Here for f/u s/p bilateral knee orthovisc. She feels some improvement in the left knee, but that is the one which had less pain initially.\par \par 6/18/19: for b/l knees orthovisc injections #4.\par \par 6/11/19: Here for b/l knee orthovisc #3.\par \par 6/4/19: Here for b/l knee orthovisc #2.\par \par 5/28/19: Here for follow up. Her PCP gave her gabapentin to help with nerve pain. She also has been getting diclofenac gel.\par \par 2/19/19: Here for follow up. She just finished her PT yesterday. she still is having issues with the hands. She is also having R > L knee pain, no treatment.\par \par 1/8/19: Here for follow up. She has less motion on the R side. She does has some discomfort with lifting.\par \par 11/20/18: Here for follow up. She feels achiness in both shoulders. The left shoulder she feels popping when reaches behind her back. She still has paresthesias in the hands.\par \par 9/8/18: Here for follow up. She has one more PT for the shoulder. She is in PT for the hands. She did fall off the couch onto the R shoulder this past week.\par \par 8/7/18: Here for follow up. Her shoulders feel good but her R side CTS is still bothering her. She is in PT.\par \par 7/26/18: Here for follow up. She saw Dr. Foster for her CTS.\par \par 6/12/18: Here for f/u swelling. Doppler U/S was neg for DVT. She is having her PT evaluation tonight.\par \par 5/29/18: Here today for suture removal and f/u. She saw Dr. Foster who recommended Hand PT. She c/o pain.\par \par 5/22/18: Here today because she was concerned about swelling and tingling in the index and middle fingers on both hands. She is s/p recent b/l CTR.\par \par 5/8/18:\par MRI R shoulder: Very severe glenohumeral joint osteoarthritis as detailed above, with degenerative absence of the glenoid labrum. Severe long head biceps tendinosis and tenosynovitis. Rotator cuff tendinosis as detailed above, with chronic full-thickness tear of the supraspinatus tendon and multiple focal full-thickness clips in the infraspinatus tendon. Supraspinatus muscle atrophy and edema. Communicating joint and bursal effusions. 1.1 cm osteocartilaginous body in the subscapularis synovial recess.\par \par CT scan R shoulder:\par Very severe glenohumeral joint arthritis. This may be an inflammatory arthropathy with superimposed osteoarthritic changes. Correlate clinically. There is an accompanying large joint effusion with chronic synovitis more evident on the concurrent MR study. See description above. Moderate subacromial/subdeltoid bursitis. Rotator cuff tendinopathy, with supraspinatus and infraspinatus muscle atrophy. Mild qualitative osteoporosis.\par \par 4/10/18: Here for follow up. Her R shoulder is painful. She is scheduled for L CTR 4/11/18 and R CTR 2 weeks later. Requesting to have R TSR. She is very limited with ADL's now, concerned she is putting too much pressure L shoulder to compensate. She has stopped PT, continued HEP. Ibuprofen 800mg has been helping, unable to take until after CTR.\par \par 3/27/18: Here for follow up. Her R shoulder is painful. She is scheduled for b/l CTR, but she wants to move up her surgery date she is not delayed for her R shoulder.\par \par 3/20/18 Here for f/u with her son, now 12 weeks s/p surgery with numbness and tingling/cold sensation left hand. No pain left shoulder post op. Right shoulder pain is worsening. PT stretching and massage helped. She is forced to use arm more now that her right arm is bothering her. Left hand pain limits her.\par \par 2/6/18: Here now 6 weeks s/p surgery with numbness and tingling/cold sensation left hand. No pain left shoulder post op. Right shoulder painful exacerbation. She is starting to use arm a little more at home, but is limited by shooting pain left hand.\par \par 1/9/18: Now about two weeks. Pain improving. She is wearing her sling. Denies fevers, chills.\par \par 12/5/17: Here for follow up recurring bilateral shoulder pain, L>R. Would like to schedule L TSA. Pain is constant and increased, even right side is worse. More "cracking" sensation. Ibuprofen 800mg daily tolerated. Orthovisc did not help.\par \par 11/21/17: Here for follow up. Her shoulders have been causing more pain. She wants to consider replacement.\par \par 7/3/17: Here for bilateral orthovisc #4\par \par 6/27/17: Here for bilateral orthovisc #3\par \par 6/20/17: Here for Orthovisc #2 bilateral shoulders. Worse today because she did gardening yesterday. Pain is intermittent when she increases activity. Ice QHS\par \par 6/13/17: Here today for recurring B/L shoulder pain. L > R today. Flare up last week. Aggravated by gardening. Continues to interfere with ADL's and activity. She is interested in doing the gel injections. Cortisone injection R shoulder was not of much help. She ices frequently, applies Salonpas and takes Tumeric. Ibuprofen 800mg when pain is severe.\par \par 3/7/17: 77 y/o RHD female here for the L shoulder. Initially started with R shoulder pain, as told she had DJD. Tried PT, minimal relief. She also had L shoulder pain. She states the R shoulder bothers her more. Bothers her with most activities but she can manage throughout the day. She has had no other treatment besides Mobic. [] : no [FreeTextEntry1] : R knee

## 2023-07-25 NOTE — ASSESSMENT
[FreeTextEntry1] : s/p L RSA, R RSA.\par Xrays 10/19/21 are stable.\par Continue HEP. \par RTO 1-2 years for xrays.\par \par R knee DJD. \par Given R knee cortisone injection 2/19/18 with minimal relief. \par Completed R knee orthovisc #2 today.\par RTO to continue. \par \par \par Procedure Note:\par Viscosupplementation Injection: X-ray evidence of Osteoarthritis on this or prior visit and Patient has tried OTC's including aspirin, Ibuprofen, Aleve etc or prescription NSAIDs, and/or exercises at home and/ or physical therapy without satisfactory response. \par \par An injection of Orthovisc 2ml was injected into the right knee. The risks, benefits, and alternatives to Viscosupplementation injection were explained in full to the patient. Risks outlined include but are not limited to infection, sepsis, bleeding, scarring, skin discoloration, temporary increase in pain, syncopal episode, failure to resolve symptoms, allergic reaction, and symptom recurrence. Signs and symptoms of infection reviewed and patient advised to call immediately for redness, fevers, and/or chills. Patient understood the risks. All questions were answered. After discussion of options, patient requested Viscosupplementation. Oral informed consent was obtained and sterile prep of the injection site was performed using alcohol. Sterile technique was utilized for the procedure including the preparation of the solutions used for the injection. Ethyl chloride spray was used topically.  Sterile technique used. Patient tolerated procedure well. Post Procedure Instructions: Patient was advised to call if redness, pain, or fever occur and apply ice for 15 min. out of every hour for the next 12-24 hours as tolerated. patient was advised to rest the joint(s) for 2 days.

## 2023-08-01 ENCOUNTER — APPOINTMENT (OUTPATIENT)
Dept: ORTHOPEDIC SURGERY | Facility: CLINIC | Age: 83
End: 2023-08-01
Payer: MEDICARE

## 2023-08-01 VITALS — WEIGHT: 135 LBS | BODY MASS INDEX: 28.34 KG/M2 | HEIGHT: 58 IN

## 2023-08-01 PROCEDURE — 20611 DRAIN/INJ JOINT/BURSA W/US: CPT | Mod: RT

## 2023-08-01 NOTE — HISTORY OF PRESENT ILLNESS
[de-identified] : DOS: s/p L RSA 12/28/17, s/p R RSA 5/14/18 8/1/23: Here for R knee orthovisc #3.  7/25/23: Here for R knee orthovisc #2.  7/18/23:   Here for follow up.  Her right knee pain has returned.  Her shoulders are doing well.   7/5/22: Here for orthovisc #4 right knee.   6/28/22: Here today for R knee orthovisc #3.  She is also complaining of some soreness in the upper arms.  She was doing some yard work recently.  6/21/22: Here today for R knee orthovisc #2.   6/16/22: Here today for R knee orthovisc #1.   11/9/21: Here for R knee orthovisc #4.  11/2/21: Here for R knee orthovisc #3.  10/26/21: Here for R knee orthovisc #2.  10/19/21: here for f/u R knee and b/l shoulders. Completed orthovisc R knee 4/13/21. Sustained a fall 1-2 months ago onto R knee inducing pain. S/p b/l RSV 2017 and 2018. Here for annual x-rays, no shoulder pain.  4/13/21: Here for right knee orthovisc #4.  3/30/21: Here for right knee orthovisc #3.  3/23/21: Here for right knee orthovisc #2.  3/16/21: Follow up right knee. She states orthovisc helped but pain returned. She would like to repeat series. Her CTS is worsening.  9/15/20: Here for orthovisc #4 right knee.  9/8/20: Here for orthovisc #3 right knee.  9/1/20: Here for orthovisc #2 right knee  8/25/20: Here for orthovisc #1 right knee.  1/28/20: Here for orthovisc #4 right knee.  1/21/20: Here for orthovisc #3 right knee.   1/7/2020- She is for continued orthovisc to the right knee  12/31/19: Follow up bilateral shoulders and knees. Her shoulders are doing well. Her right knee has become more painful. Orthovisc helped in June.  7/30/19: Here for f/u s/p bilateral knee orthovisc. She feels some improvement in the left knee, but that is the one which had less pain initially.  6/18/19: for b/l knees orthovisc injections #4.  6/11/19: Here for b/l knee orthovisc #3.  6/4/19: Here for b/l knee orthovisc #2.  5/28/19: Here for follow up. Her PCP gave her gabapentin to help with nerve pain. She also has been getting diclofenac gel.  2/19/19: Here for follow up. She just finished her PT yesterday. she still is having issues with the hands. She is also having R > L knee pain, no treatment.  1/8/19: Here for follow up. She has less motion on the R side. She does has some discomfort with lifting.  11/20/18: Here for follow up. She feels achiness in both shoulders. The left shoulder she feels popping when reaches behind her back. She still has paresthesias in the hands.  9/8/18: Here for follow up. She has one more PT for the shoulder. She is in PT for the hands. She did fall off the couch onto the R shoulder this past week.  8/7/18: Here for follow up. Her shoulders feel good but her R side CTS is still bothering her. She is in PT.  7/26/18: Here for follow up. She saw Dr. Foster for her CTS.  6/12/18: Here for f/u swelling. Doppler U/S was neg for DVT. She is having her PT evaluation tonight.  5/29/18: Here today for suture removal and f/u. She saw Dr. Foster who recommended Hand PT. She c/o pain.  5/22/18: Here today because she was concerned about swelling and tingling in the index and middle fingers on both hands. She is s/p recent b/l CTR.  5/8/18: MRI R shoulder: Very severe glenohumeral joint osteoarthritis as detailed above, with degenerative absence of the glenoid labrum. Severe long head biceps tendinosis and tenosynovitis. Rotator cuff tendinosis as detailed above, with chronic full-thickness tear of the supraspinatus tendon and multiple focal full-thickness clips in the infraspinatus tendon. Supraspinatus muscle atrophy and edema. Communicating joint and bursal effusions. 1.1 cm osteocartilaginous body in the subscapularis synovial recess.  CT scan R shoulder: Very severe glenohumeral joint arthritis. This may be an inflammatory arthropathy with superimposed osteoarthritic changes. Correlate clinically. There is an accompanying large joint effusion with chronic synovitis more evident on the concurrent MR study. See description above. Moderate subacromial/subdeltoid bursitis. Rotator cuff tendinopathy, with supraspinatus and infraspinatus muscle atrophy. Mild qualitative osteoporosis.  4/10/18: Here for follow up. Her R shoulder is painful. She is scheduled for L CTR 4/11/18 and R CTR 2 weeks later. Requesting to have R TSR. She is very limited with ADL's now, concerned she is putting too much pressure L shoulder to compensate. She has stopped PT, continued HEP. Ibuprofen 800mg has been helping, unable to take until after CTR.  3/27/18: Here for follow up. Her R shoulder is painful. She is scheduled for b/l CTR, but she wants to move up her surgery date she is not delayed for her R shoulder.  3/20/18 Here for f/u with her son, now 12 weeks s/p surgery with numbness and tingling/cold sensation left hand. No pain left shoulder post op. Right shoulder pain is worsening. PT stretching and massage helped. She is forced to use arm more now that her right arm is bothering her. Left hand pain limits her.  2/6/18: Here now 6 weeks s/p surgery with numbness and tingling/cold sensation left hand. No pain left shoulder post op. Right shoulder painful exacerbation. She is starting to use arm a little more at home, but is limited by shooting pain left hand.  1/9/18: Now about two weeks. Pain improving. She is wearing her sling. Denies fevers, chills.  12/5/17: Here for follow up recurring bilateral shoulder pain, L>R. Would like to schedule L TSA. Pain is constant and increased, even right side is worse. More "cracking" sensation. Ibuprofen 800mg daily tolerated. Orthovisc did not help.  11/21/17: Here for follow up. Her shoulders have been causing more pain. She wants to consider replacement.  7/3/17: Here for bilateral orthovisc #4  6/27/17: Here for bilateral orthovisc #3  6/20/17: Here for Orthovisc #2 bilateral shoulders. Worse today because she did gardening yesterday. Pain is intermittent when she increases activity. Ice QHS  6/13/17: Here today for recurring B/L shoulder pain. L > R today. Flare up last week. Aggravated by gardening. Continues to interfere with ADL's and activity. She is interested in doing the gel injections. Cortisone injection R shoulder was not of much help. She ices frequently, applies Salonpas and takes Tumeric. Ibuprofen 800mg when pain is severe.  3/7/17: 77 y/o RHD female here for the L shoulder. Initially started with R shoulder pain, as told she had DJD. Tried PT, minimal relief. She also had L shoulder pain. She states the R shoulder bothers her more. Bothers her with most activities but she can manage throughout the day. She has had no other treatment besides Mobic.

## 2023-08-01 NOTE — ASSESSMENT
[FreeTextEntry1] : s/p L RSA, R RSA. Xrays 10/19/21 are stable. Continue HEP.  RTO 1-2 years for xrays.  R knee DJD.  Given R knee cortisone injection 2/19/18 with minimal relief.  Completed R knee orthovisc #3 today. RTO to continue.    Procedure Note: Viscosupplementation Injection: X-ray evidence of Osteoarthritis on this or prior visit and Patient has tried OTC's including aspirin, Ibuprofen, Aleve etc or prescription NSAIDs, and/or exercises at home and/ or physical therapy without satisfactory response.   An injection of Orthovisc 2ml was injected into the right knee. The risks, benefits, and alternatives to Viscosupplementation injection were explained in full to the patient. Risks outlined include but are not limited to infection, sepsis, bleeding, scarring, skin discoloration, temporary increase in pain, syncopal episode, failure to resolve symptoms, allergic reaction, and symptom recurrence. Signs and symptoms of infection reviewed and patient advised to call immediately for redness, fevers, and/or chills. Patient understood the risks. All questions were answered. After discussion of options, patient requested Viscosupplementation. Oral informed consent was obtained and sterile prep of the injection site was performed using alcohol. Sterile technique was utilized for the procedure including the preparation of the solutions used for the injection. Ethyl chloride spray was used topically.  Sterile technique used. Patient tolerated procedure well. Post Procedure Instructions: Patient was advised to call if redness, pain, or fever occur and apply ice for 15 min. out of every hour for the next 12-24 hours as tolerated. patient was advised to rest the joint(s) for 2 days.

## 2023-08-08 ENCOUNTER — APPOINTMENT (OUTPATIENT)
Dept: ORTHOPEDIC SURGERY | Facility: CLINIC | Age: 83
End: 2023-08-08
Payer: MEDICARE

## 2023-08-08 VITALS — HEIGHT: 58 IN | BODY MASS INDEX: 28.34 KG/M2 | WEIGHT: 135 LBS

## 2023-08-08 PROCEDURE — 20611 DRAIN/INJ JOINT/BURSA W/US: CPT | Mod: RT

## 2023-08-08 NOTE — HISTORY OF PRESENT ILLNESS
[de-identified] : DOS: s/p L RSA 12/28/17, s/p R RSA 5/14/18 8/8/23: Here for right knee orthovisc #4.  8/1/23: Here for R knee orthovisc #3.  7/25/23: Here for R knee orthovisc #2.  7/18/23:   Here for follow up.  Her right knee pain has returned.  Her shoulders are doing well.   7/5/22: Here for orthovisc #4 right knee.   6/28/22: Here today for R knee orthovisc #3.  She is also complaining of some soreness in the upper arms.  She was doing some yard work recently.  6/21/22: Here today for R knee orthovisc #2.   6/16/22: Here today for R knee orthovisc #1.   11/9/21: Here for R knee orthovisc #4.  11/2/21: Here for R knee orthovisc #3.  10/26/21: Here for R knee orthovisc #2.  10/19/21: here for f/u R knee and b/l shoulders. Completed orthovisc R knee 4/13/21. Sustained a fall 1-2 months ago onto R knee inducing pain. S/p b/l RSV 2017 and 2018. Here for annual x-rays, no shoulder pain.  4/13/21: Here for right knee orthovisc #4.  3/30/21: Here for right knee orthovisc #3.  3/23/21: Here for right knee orthovisc #2.  3/16/21: Follow up right knee. She states orthovisc helped but pain returned. She would like to repeat series. Her CTS is worsening.  9/15/20: Here for orthovisc #4 right knee.  9/8/20: Here for orthovisc #3 right knee.  9/1/20: Here for orthovisc #2 right knee  8/25/20: Here for orthovisc #1 right knee.  1/28/20: Here for orthovisc #4 right knee.  1/21/20: Here for orthovisc #3 right knee.   1/7/2020- She is for continued orthovisc to the right knee  12/31/19: Follow up bilateral shoulders and knees. Her shoulders are doing well. Her right knee has become more painful. Orthovisc helped in June.  7/30/19: Here for f/u s/p bilateral knee orthovisc. She feels some improvement in the left knee, but that is the one which had less pain initially.  6/18/19: for b/l knees orthovisc injections #4.  6/11/19: Here for b/l knee orthovisc #3.  6/4/19: Here for b/l knee orthovisc #2.  5/28/19: Here for follow up. Her PCP gave her gabapentin to help with nerve pain. She also has been getting diclofenac gel.  2/19/19: Here for follow up. She just finished her PT yesterday. she still is having issues with the hands. She is also having R > L knee pain, no treatment.  1/8/19: Here for follow up. She has less motion on the R side. She does has some discomfort with lifting.  11/20/18: Here for follow up. She feels achiness in both shoulders. The left shoulder she feels popping when reaches behind her back. She still has paresthesias in the hands.  9/8/18: Here for follow up. She has one more PT for the shoulder. She is in PT for the hands. She did fall off the couch onto the R shoulder this past week.  8/7/18: Here for follow up. Her shoulders feel good but her R side CTS is still bothering her. She is in PT.  7/26/18: Here for follow up. She saw Dr. Foster for her CTS.  6/12/18: Here for f/u swelling. Doppler U/S was neg for DVT. She is having her PT evaluation tonight.  5/29/18: Here today for suture removal and f/u. She saw Dr. Foster who recommended Hand PT. She c/o pain.  5/22/18: Here today because she was concerned about swelling and tingling in the index and middle fingers on both hands. She is s/p recent b/l CTR.  5/8/18: MRI R shoulder: Very severe glenohumeral joint osteoarthritis as detailed above, with degenerative absence of the glenoid labrum. Severe long head biceps tendinosis and tenosynovitis. Rotator cuff tendinosis as detailed above, with chronic full-thickness tear of the supraspinatus tendon and multiple focal full-thickness clips in the infraspinatus tendon. Supraspinatus muscle atrophy and edema. Communicating joint and bursal effusions. 1.1 cm osteocartilaginous body in the subscapularis synovial recess.  CT scan R shoulder: Very severe glenohumeral joint arthritis. This may be an inflammatory arthropathy with superimposed osteoarthritic changes. Correlate clinically. There is an accompanying large joint effusion with chronic synovitis more evident on the concurrent MR study. See description above. Moderate subacromial/subdeltoid bursitis. Rotator cuff tendinopathy, with supraspinatus and infraspinatus muscle atrophy. Mild qualitative osteoporosis.  4/10/18: Here for follow up. Her R shoulder is painful. She is scheduled for L CTR 4/11/18 and R CTR 2 weeks later. Requesting to have R TSR. She is very limited with ADL's now, concerned she is putting too much pressure L shoulder to compensate. She has stopped PT, continued HEP. Ibuprofen 800mg has been helping, unable to take until after CTR.  3/27/18: Here for follow up. Her R shoulder is painful. She is scheduled for b/l CTR, but she wants to move up her surgery date she is not delayed for her R shoulder.  3/20/18 Here for f/u with her son, now 12 weeks s/p surgery with numbness and tingling/cold sensation left hand. No pain left shoulder post op. Right shoulder pain is worsening. PT stretching and massage helped. She is forced to use arm more now that her right arm is bothering her. Left hand pain limits her.  2/6/18: Here now 6 weeks s/p surgery with numbness and tingling/cold sensation left hand. No pain left shoulder post op. Right shoulder painful exacerbation. She is starting to use arm a little more at home, but is limited by shooting pain left hand.  1/9/18: Now about two weeks. Pain improving. She is wearing her sling. Denies fevers, chills.  12/5/17: Here for follow up recurring bilateral shoulder pain, L>R. Would like to schedule L TSA. Pain is constant and increased, even right side is worse. More "cracking" sensation. Ibuprofen 800mg daily tolerated. Orthovisc did not help.  11/21/17: Here for follow up. Her shoulders have been causing more pain. She wants to consider replacement.  7/3/17: Here for bilateral orthovisc #4  6/27/17: Here for bilateral orthovisc #3  6/20/17: Here for Orthovisc #2 bilateral shoulders. Worse today because she did gardening yesterday. Pain is intermittent when she increases activity. Ice QHS  6/13/17: Here today for recurring B/L shoulder pain. L > R today. Flare up last week. Aggravated by gardening. Continues to interfere with ADL's and activity. She is interested in doing the gel injections. Cortisone injection R shoulder was not of much help. She ices frequently, applies Salonpas and takes Tumeric. Ibuprofen 800mg when pain is severe.  3/7/17: 75 y/o RHD female here for the L shoulder. Initially started with R shoulder pain, as told she had DJD. Tried PT, minimal relief. She also had L shoulder pain. She states the R shoulder bothers her more. Bothers her with most activities but she can manage throughout the day. She has had no other treatment besides Mobic.

## 2023-08-08 NOTE — ASSESSMENT
[FreeTextEntry1] : s/p L RSA, R RSA. Xrays 10/19/21 are stable. Continue HEP.  RTO 1-2 years for xrays.  R knee DJD.  Given R knee cortisone injection 2/19/18 with minimal relief.  Completed R knee orthovisc #4 today. RTO prn, recommend seeing Dr. Phillips if consider TKA.   Procedure Note: Viscosupplementation Injection: X-ray evidence of Osteoarthritis on this or prior visit and Patient has tried OTC's including aspirin, Ibuprofen, Aleve etc or prescription NSAIDs, and/or exercises at home and/ or physical therapy without satisfactory response.   An injection of Orthovisc 2ml was injected into the right knee. The risks, benefits, and alternatives to Viscosupplementation injection were explained in full to the patient. Risks outlined include but are not limited to infection, sepsis, bleeding, scarring, skin discoloration, temporary increase in pain, syncopal episode, failure to resolve symptoms, allergic reaction, and symptom recurrence. Signs and symptoms of infection reviewed and patient advised to call immediately for redness, fevers, and/or chills. Patient understood the risks. All questions were answered. After discussion of options, patient requested Viscosupplementation. Oral informed consent was obtained and sterile prep of the injection site was performed using alcohol. Sterile technique was utilized for the procedure including the preparation of the solutions used for the injection. Ethyl chloride spray was used topically.  Sterile technique used. Patient tolerated procedure well. Post Procedure Instructions: Patient was advised to call if redness, pain, or fever occur and apply ice for 15 min. out of every hour for the next 12-24 hours as tolerated. patient was advised to rest the joint(s) for 2 days.

## 2023-10-26 NOTE — DISCHARGE NOTE ADULT - PATIENT PORTAL LINK FT
Loculated pleural effusion
You can access the [facility], offered by [facility], by registering with the following website: [email or web address]

## 2023-10-30 ENCOUNTER — APPOINTMENT (OUTPATIENT)
Dept: SURGERY | Facility: CLINIC | Age: 83
End: 2023-10-30
Payer: MEDICARE

## 2023-10-30 VITALS
HEART RATE: 86 BPM | HEIGHT: 58 IN | DIASTOLIC BLOOD PRESSURE: 73 MMHG | SYSTOLIC BLOOD PRESSURE: 134 MMHG | WEIGHT: 135 LBS | BODY MASS INDEX: 28.34 KG/M2 | RESPIRATION RATE: 14 BRPM | TEMPERATURE: 97.8 F | OXYGEN SATURATION: 100 %

## 2023-10-30 DIAGNOSIS — C50.911 MALIGNANT NEOPLASM OF UNSPECIFIED SITE OF RIGHT FEMALE BREAST: ICD-10-CM

## 2023-10-30 PROCEDURE — 99213 OFFICE O/P EST LOW 20 MIN: CPT

## 2024-05-07 ENCOUNTER — APPOINTMENT (OUTPATIENT)
Dept: ORTHOPEDIC SURGERY | Facility: CLINIC | Age: 84
End: 2024-05-07
Payer: MEDICARE

## 2024-05-07 VITALS — BODY MASS INDEX: 28.97 KG/M2 | HEIGHT: 58 IN | WEIGHT: 138 LBS

## 2024-05-07 DIAGNOSIS — M17.11 UNILATERAL PRIMARY OSTEOARTHRITIS, RIGHT KNEE: ICD-10-CM

## 2024-05-07 DIAGNOSIS — Z86.79 PERSONAL HISTORY OF OTHER DISEASES OF THE CIRCULATORY SYSTEM: ICD-10-CM

## 2024-05-07 PROCEDURE — 73564 X-RAY EXAM KNEE 4 OR MORE: CPT | Mod: RT

## 2024-05-07 PROCEDURE — 99215 OFFICE O/P EST HI 40 MIN: CPT

## 2024-05-07 NOTE — HISTORY OF PRESENT ILLNESS
[8] : 8 [Dull/Aching] : dull/aching [Constant] : constant [Sleep] : sleep [Sitting] : sitting [Standing] : standing [Walking] : walking [Stairs] : stairs [de-identified] : 5.7.24 PATIENT HERE FOR RIGHT KNEE PAIN. PAIN STARTED YEARS AGO. PATIENT FELL IN 2021. PATIENT STATES SHES BEEN PUTTING IF OFF. HAS AN MRI FROM 9/2/21(EDDIE)

## 2024-05-07 NOTE — PHYSICAL EXAM
[Right] : right knee [] : no erythema [TWNoteComboBox7] : flexion 130 degrees [de-identified] : extension 0 degrees

## 2024-05-07 NOTE — ASSESSMENT
[FreeTextEntry1] : 83 year F WITH MODERATE RT KNEE PAIN. PAIN WORSENS WITH STAIRS AND WALKING PROLONGED DISTANCES. PAIN IS AFFECTING ADL AND FUNCTIONAL ACTIVITIES. XRAYS REVIEWED WITH ADVANCED MEDIAL OA. TREATMENT OPTIONS REVIEWED. QUESTIONS ANSWERED. RT TKA DISCUSSED AT LENGTH.   PMHx: HTN, H/O BREAST CA NO METAL ALLERGIES NO H/O DM NO H/O DVT/PE NO H/O MRSA/VRSA  RT TKA -   DISCUSSED R&B OF TKA. WILL ORDER CT TO EVAL FOR BONE LOSS AND DEFORMITY FOR PRE-OP PLANNING.   The patient was advised of the diagnosis. The natural history of the pathology was explained in full to the patient in layman's terms. All questions were answered. The risks and benefits of surgical and non-surgical treatment alternatives were explained in full to the patient.   We discussed my findings and the natural history of their condition. We talked about the details of the proposed surgery and the recovery. We discussed the material risks, possible benefits and alternatives to surgery. The risks include but are not limited to infection, bleeding and possible need for blood transfusion, fracture, bowel blockage, bladder retention or infection, need for reoperation, stiffness and/or limited range of motion, possible damage to nerves and blood vessels, failure of fixation of components, risk of deep vein thromboses and pulmonary embolism, wound healing problems, dislocation, and possible leg length discrepancy. Although incredibly rare, we also discussed the risks of a cardiac event, stroke and even death during, or following, the surgery. We discussed the type of implants the patient will be receiving and the type of fixation that will be used, as well as whether a robot or computer navigation aide will be used. The patient understands they will need medical clearance and will attend a preoperative joint education class. We also discussed the type of anesthesia they will receive, and the risks associated with hospital or rehab length of stay, obesity, diabetes and smoking.   Patient Complains of pain in the affected joint with a level that often reaches greater than a 8/10. The pain has been progressively worsening throughout his/her treatment course. The pain has interfered with their ADLs and worsens with weight bearing. On exam they often have episodes of swelling/effusion with limited ROM. Pain worsens with ROM passive and active and I can palpate crepitus.   X- rays were reviewed with the patient and they show joint space narrowing, subchondral sclerosis, osteophyte formation, and subchondral cysts. After a period of more than 12 weeks physical therapy or exercise program done with me or another treating physician they have continued pain. The patient has failed a trial of NSAID medication or pain relievers if they were unable to tolerate NSAID medications as well as a series of injections, steroids, or hyaluronic acid. After a long discussion with the patient both the patient and I have decided we have exhausted all forms of less radical treatments and they would like to proceed with Total Joint Replacement.   Patient will plan to schedule surgery. Patient requires rolling walker and 3-in-1 commode S/P surgery. Patient currently as limited mobility that significantly impairs their ability to participate in one or more mobility related activities of daily living in the home. The patient is able to safely use the walker and the functional mobility deficit can be sufficiently resolved with the use of a walker. Patient will also be confined to one level of the home environment and there is no toilet on that level. Requires 3-in-1 commode for bedside use as patient cannot access bathroom safely in their home in timely manner. Will order rolling walker and 3-in-1 commode.

## 2024-05-10 ENCOUNTER — RESULT REVIEW (OUTPATIENT)
Age: 84
End: 2024-05-10

## 2024-05-13 ENCOUNTER — APPOINTMENT (OUTPATIENT)
Dept: SURGERY | Facility: CLINIC | Age: 84
End: 2024-05-13
Payer: MEDICARE

## 2024-05-13 VITALS
SYSTOLIC BLOOD PRESSURE: 144 MMHG | DIASTOLIC BLOOD PRESSURE: 75 MMHG | TEMPERATURE: 98.6 F | OXYGEN SATURATION: 97 % | WEIGHT: 135 LBS | HEART RATE: 86 BPM | HEIGHT: 58 IN | BODY MASS INDEX: 28.34 KG/M2 | RESPIRATION RATE: 14 BRPM

## 2024-05-13 DIAGNOSIS — Z12.39 ENCOUNTER FOR OTHER SCREENING FOR MALIGNANT NEOPLASM OF BREAST: ICD-10-CM

## 2024-05-13 DIAGNOSIS — Z85.3 PERSONAL HISTORY OF MALIGNANT NEOPLASM OF BREAST: ICD-10-CM

## 2024-05-13 DIAGNOSIS — N60.12 DIFFUSE CYSTIC MASTOPATHY OF RIGHT BREAST: ICD-10-CM

## 2024-05-13 DIAGNOSIS — N63.0 UNSPECIFIED LUMP IN UNSPECIFIED BREAST: ICD-10-CM

## 2024-05-13 DIAGNOSIS — N60.11 DIFFUSE CYSTIC MASTOPATHY OF RIGHT BREAST: ICD-10-CM

## 2024-05-13 PROCEDURE — 99213 OFFICE O/P EST LOW 20 MIN: CPT

## 2024-05-17 ENCOUNTER — NON-APPOINTMENT (OUTPATIENT)
Age: 84
End: 2024-05-17

## 2024-05-20 ENCOUNTER — OUTPATIENT (OUTPATIENT)
Dept: OUTPATIENT SERVICES | Facility: HOSPITAL | Age: 84
LOS: 1 days | End: 2024-05-20
Payer: MEDICARE

## 2024-05-20 VITALS
HEIGHT: 56 IN | SYSTOLIC BLOOD PRESSURE: 142 MMHG | TEMPERATURE: 98 F | DIASTOLIC BLOOD PRESSURE: 76 MMHG | WEIGHT: 132.06 LBS | HEART RATE: 62 BPM | RESPIRATION RATE: 15 BRPM | OXYGEN SATURATION: 97 %

## 2024-05-20 DIAGNOSIS — Z90.89 ACQUIRED ABSENCE OF OTHER ORGANS: Chronic | ICD-10-CM

## 2024-05-20 DIAGNOSIS — M17.11 UNILATERAL PRIMARY OSTEOARTHRITIS, RIGHT KNEE: ICD-10-CM

## 2024-05-20 DIAGNOSIS — Z90.49 ACQUIRED ABSENCE OF OTHER SPECIFIED PARTS OF DIGESTIVE TRACT: Chronic | ICD-10-CM

## 2024-05-20 DIAGNOSIS — Z90.11 ACQUIRED ABSENCE OF RIGHT BREAST AND NIPPLE: Chronic | ICD-10-CM

## 2024-05-20 DIAGNOSIS — Z01.818 ENCOUNTER FOR OTHER PREPROCEDURAL EXAMINATION: ICD-10-CM

## 2024-05-20 DIAGNOSIS — Z98.890 OTHER SPECIFIED POSTPROCEDURAL STATES: Chronic | ICD-10-CM

## 2024-05-20 DIAGNOSIS — Z96.612 PRESENCE OF LEFT ARTIFICIAL SHOULDER JOINT: Chronic | ICD-10-CM

## 2024-05-20 LAB
A1C WITH ESTIMATED AVERAGE GLUCOSE RESULT: 5.7 % — HIGH (ref 4–5.6)
ALBUMIN SERPL ELPH-MCNC: 3.9 G/DL — SIGNIFICANT CHANGE UP (ref 3.3–5)
ALP SERPL-CCNC: 57 U/L — SIGNIFICANT CHANGE UP (ref 30–120)
ALT FLD-CCNC: 22 U/L — SIGNIFICANT CHANGE UP (ref 10–60)
ANION GAP SERPL CALC-SCNC: 6 MMOL/L — SIGNIFICANT CHANGE UP (ref 5–17)
APTT BLD: 32.6 SEC — SIGNIFICANT CHANGE UP (ref 24.5–35.6)
AST SERPL-CCNC: 20 U/L — SIGNIFICANT CHANGE UP (ref 10–40)
BILIRUB SERPL-MCNC: 0.7 MG/DL — SIGNIFICANT CHANGE UP (ref 0.2–1.2)
BLD GP AB SCN SERPL QL: SIGNIFICANT CHANGE UP
BUN SERPL-MCNC: 33 MG/DL — HIGH (ref 7–23)
CALCIUM SERPL-MCNC: 9.7 MG/DL — SIGNIFICANT CHANGE UP (ref 8.4–10.5)
CHLORIDE SERPL-SCNC: 104 MMOL/L — SIGNIFICANT CHANGE UP (ref 96–108)
CO2 SERPL-SCNC: 30 MMOL/L — SIGNIFICANT CHANGE UP (ref 22–31)
CREAT SERPL-MCNC: 1.16 MG/DL — SIGNIFICANT CHANGE UP (ref 0.5–1.3)
EGFR: 46 ML/MIN/1.73M2 — LOW
ESTIMATED AVERAGE GLUCOSE: 117 MG/DL — HIGH (ref 68–114)
GLUCOSE SERPL-MCNC: 112 MG/DL — HIGH (ref 70–99)
HCT VFR BLD CALC: 41.5 % — SIGNIFICANT CHANGE UP (ref 34.5–45)
HGB BLD-MCNC: 13.3 G/DL — SIGNIFICANT CHANGE UP (ref 11.5–15.5)
INR BLD: 1.06 RATIO — SIGNIFICANT CHANGE UP (ref 0.85–1.18)
MCHC RBC-ENTMCNC: 30.5 PG — SIGNIFICANT CHANGE UP (ref 27–34)
MCHC RBC-ENTMCNC: 32 GM/DL — SIGNIFICANT CHANGE UP (ref 32–36)
MCV RBC AUTO: 95.2 FL — SIGNIFICANT CHANGE UP (ref 80–100)
MRSA PCR RESULT.: SIGNIFICANT CHANGE UP
NRBC # BLD: 0 /100 WBCS — SIGNIFICANT CHANGE UP (ref 0–0)
PLATELET # BLD AUTO: 217 K/UL — SIGNIFICANT CHANGE UP (ref 150–400)
POTASSIUM SERPL-MCNC: 4.7 MMOL/L — SIGNIFICANT CHANGE UP (ref 3.5–5.3)
POTASSIUM SERPL-SCNC: 4.7 MMOL/L — SIGNIFICANT CHANGE UP (ref 3.5–5.3)
PROT SERPL-MCNC: 7 G/DL — SIGNIFICANT CHANGE UP (ref 6–8.3)
PROTHROM AB SERPL-ACNC: 11.8 SEC — SIGNIFICANT CHANGE UP (ref 9.5–13)
RBC # BLD: 4.36 M/UL — SIGNIFICANT CHANGE UP (ref 3.8–5.2)
RBC # FLD: 12.7 % — SIGNIFICANT CHANGE UP (ref 10.3–14.5)
S AUREUS DNA NOSE QL NAA+PROBE: SIGNIFICANT CHANGE UP
SODIUM SERPL-SCNC: 140 MMOL/L — SIGNIFICANT CHANGE UP (ref 135–145)
WBC # BLD: 6.29 K/UL — SIGNIFICANT CHANGE UP (ref 3.8–10.5)
WBC # FLD AUTO: 6.29 K/UL — SIGNIFICANT CHANGE UP (ref 3.8–10.5)

## 2024-05-20 PROCEDURE — 86901 BLOOD TYPING SEROLOGIC RH(D): CPT

## 2024-05-20 PROCEDURE — 85610 PROTHROMBIN TIME: CPT

## 2024-05-20 PROCEDURE — G0463: CPT

## 2024-05-20 PROCEDURE — 85730 THROMBOPLASTIN TIME PARTIAL: CPT

## 2024-05-20 PROCEDURE — 85027 COMPLETE CBC AUTOMATED: CPT

## 2024-05-20 PROCEDURE — 80053 COMPREHEN METABOLIC PANEL: CPT

## 2024-05-20 PROCEDURE — 83036 HEMOGLOBIN GLYCOSYLATED A1C: CPT

## 2024-05-20 PROCEDURE — 87641 MR-STAPH DNA AMP PROBE: CPT

## 2024-05-20 PROCEDURE — 87640 STAPH A DNA AMP PROBE: CPT

## 2024-05-20 PROCEDURE — 86900 BLOOD TYPING SEROLOGIC ABO: CPT

## 2024-05-20 PROCEDURE — 36415 COLL VENOUS BLD VENIPUNCTURE: CPT

## 2024-05-20 PROCEDURE — 86850 RBC ANTIBODY SCREEN: CPT

## 2024-05-20 RX ORDER — HYDROCHLOROTHIAZIDE 25 MG
1 TABLET ORAL
Qty: 0 | Refills: 0 | DISCHARGE

## 2024-05-20 RX ORDER — LOSARTAN POTASSIUM 100 MG/1
1 TABLET, FILM COATED ORAL
Qty: 0 | Refills: 0 | DISCHARGE

## 2024-05-20 NOTE — H&P PST ADULT - NSICDXFAMILYHX_GEN_ALL_CORE_FT
FAMILY HISTORY:  Father  Still living? Unknown  Family history of heart disease, Age at diagnosis: Age Unknown    Mother  Still living? No  Family history of heart disease, Age at diagnosis: Age Unknown    Sibling  Still living? No  Diabetes mellitus, Age at diagnosis: Age Unknown  Family history of breast cancer in sister, Age at diagnosis: Age Unknown

## 2024-05-20 NOTE — H&P PST ADULT - NSICDXPASTMEDICALHX_GEN_ALL_CORE_FT
PAST MEDICAL HISTORY:  Breast cancer right 2003    Hypertension     Osteoarthritis of right knee

## 2024-05-20 NOTE — H&P PST ADULT - PROBLEM SELECTOR PLAN 1
Right total knee replacement is planned for 6/5/2024  Diagnostic testing performed  Medical and cardiac clearances requested  Pre op instructions were reviewed including joint replacement protocols  Best wishes offered

## 2024-05-20 NOTE — H&P PST ADULT - HISTORY OF PRESENT ILLNESS
84 yo female complains of right shoulder pain for many years for which she has tried HA injections without relief.  She is scheduled for right total knee replacement on 6/5/2024 @ [facility].

## 2024-05-20 NOTE — H&P PST ADULT - NSICDXPASTSURGICALHX_GEN_ALL_CORE_FT
PAST SURGICAL HISTORY:  History of lumpectomy of right breast 2003 - radiation also    S/P appendectomy At Childhood    S/p bilateral shoulder joint replacement     S/P carpal tunnel release Right & Left ( April 2018 )    S/P laparoscopic cholecystectomy     S/P tonsillectomy At Childhood

## 2024-05-21 ENCOUNTER — NON-APPOINTMENT (OUTPATIENT)
Age: 84
End: 2024-05-21

## 2024-06-04 ENCOUNTER — TRANSCRIPTION ENCOUNTER (OUTPATIENT)
Age: 84
End: 2024-06-04

## 2024-06-05 ENCOUNTER — INPATIENT (INPATIENT)
Facility: HOSPITAL | Age: 84
LOS: 1 days | Discharge: HOME | DRG: 554 | End: 2024-06-07
Attending: ORTHOPAEDIC SURGERY | Admitting: ORTHOPAEDIC SURGERY
Payer: MEDICARE

## 2024-06-05 ENCOUNTER — APPOINTMENT (OUTPATIENT)
Dept: ORTHOPEDIC SURGERY | Facility: HOSPITAL | Age: 84
End: 2024-06-05
Payer: MEDICARE

## 2024-06-05 ENCOUNTER — TRANSCRIPTION ENCOUNTER (OUTPATIENT)
Age: 84
End: 2024-06-05

## 2024-06-05 VITALS
HEART RATE: 59 BPM | RESPIRATION RATE: 15 BRPM | HEIGHT: 58 IN | OXYGEN SATURATION: 97 % | SYSTOLIC BLOOD PRESSURE: 158 MMHG | WEIGHT: 138.67 LBS | DIASTOLIC BLOOD PRESSURE: 60 MMHG | TEMPERATURE: 97 F

## 2024-06-05 DIAGNOSIS — M17.11 UNILATERAL PRIMARY OSTEOARTHRITIS, RIGHT KNEE: ICD-10-CM

## 2024-06-05 DIAGNOSIS — Z01.818 ENCOUNTER FOR OTHER PREPROCEDURAL EXAMINATION: ICD-10-CM

## 2024-06-05 DIAGNOSIS — Z90.49 ACQUIRED ABSENCE OF OTHER SPECIFIED PARTS OF DIGESTIVE TRACT: Chronic | ICD-10-CM

## 2024-06-05 DIAGNOSIS — Z98.890 OTHER SPECIFIED POSTPROCEDURAL STATES: Chronic | ICD-10-CM

## 2024-06-05 DIAGNOSIS — Z96.612 PRESENCE OF LEFT ARTIFICIAL SHOULDER JOINT: Chronic | ICD-10-CM

## 2024-06-05 DIAGNOSIS — Z90.89 ACQUIRED ABSENCE OF OTHER ORGANS: Chronic | ICD-10-CM

## 2024-06-05 DIAGNOSIS — Z90.11 ACQUIRED ABSENCE OF RIGHT BREAST AND NIPPLE: Chronic | ICD-10-CM

## 2024-06-05 PROCEDURE — 20985 CPTR-ASST DIR MS PX: CPT | Mod: AS

## 2024-06-05 PROCEDURE — 20985 CPTR-ASST DIR MS PX: CPT

## 2024-06-05 PROCEDURE — 27447 TOTAL KNEE ARTHROPLASTY: CPT | Mod: AS,RT

## 2024-06-05 PROCEDURE — 76000 FLUOROSCOPY <1 HR PHYS/QHP: CPT | Mod: 26

## 2024-06-05 PROCEDURE — 27447 TOTAL KNEE ARTHROPLASTY: CPT | Mod: RT

## 2024-06-05 PROCEDURE — 99222 1ST HOSP IP/OBS MODERATE 55: CPT

## 2024-06-05 PROCEDURE — 76000 FLUOROSCOPY <1 HR PHYS/QHP: CPT | Mod: 26,AS

## 2024-06-05 PROCEDURE — 73560 X-RAY EXAM OF KNEE 1 OR 2: CPT | Mod: 26,RT

## 2024-06-05 DEVICE — PATELLA ASYMMETRIC TRIATHLON 29MM: Type: IMPLANTABLE DEVICE | Site: RIGHT | Status: FUNCTIONAL

## 2024-06-05 DEVICE — INSERT TIB BEARING CS X3 SZ 3 11MM: Type: IMPLANTABLE DEVICE | Site: RIGHT | Status: FUNCTIONAL

## 2024-06-05 DEVICE — TRIATHLON TIBIAL COMP SZ 3: Type: IMPLANTABLE DEVICE | Site: RIGHT | Status: FUNCTIONAL

## 2024-06-05 DEVICE — MAKO BONE PIN 4MM X 140MM: Type: IMPLANTABLE DEVICE | Site: RIGHT | Status: FUNCTIONAL

## 2024-06-05 DEVICE — COMP FEM TRIATHLON CR SZ3 RT: Type: IMPLANTABLE DEVICE | Site: RIGHT | Status: FUNCTIONAL

## 2024-06-05 DEVICE — MAKO BONE PIN 4MM X 110MM: Type: IMPLANTABLE DEVICE | Site: RIGHT | Status: FUNCTIONAL

## 2024-06-05 RX ORDER — ACETAMINOPHEN 500 MG
1000 TABLET ORAL EVERY 8 HOURS
Refills: 0 | Status: DISCONTINUED | OUTPATIENT
Start: 2024-06-06 | End: 2024-06-07

## 2024-06-05 RX ORDER — MULTIVIT-MIN/FERROUS GLUCONATE 9 MG/15 ML
1 LIQUID (ML) ORAL
Qty: 0 | Refills: 0 | DISCHARGE

## 2024-06-05 RX ORDER — OXYCODONE HYDROCHLORIDE 5 MG/1
10 TABLET ORAL
Refills: 0 | Status: DISCONTINUED | OUTPATIENT
Start: 2024-06-05 | End: 2024-06-07

## 2024-06-05 RX ORDER — PANTOPRAZOLE SODIUM 20 MG/1
40 TABLET, DELAYED RELEASE ORAL
Refills: 0 | Status: DISCONTINUED | OUTPATIENT
Start: 2024-06-05 | End: 2024-06-07

## 2024-06-05 RX ORDER — CELECOXIB 200 MG/1
200 CAPSULE ORAL EVERY 12 HOURS
Refills: 0 | Status: DISCONTINUED | OUTPATIENT
Start: 2024-06-05 | End: 2024-06-07

## 2024-06-05 RX ORDER — CHOLECALCIFEROL (VITAMIN D3) 125 MCG
1 CAPSULE ORAL
Refills: 0 | DISCHARGE

## 2024-06-05 RX ORDER — HYDROMORPHONE HYDROCHLORIDE 2 MG/ML
0.5 INJECTION INTRAMUSCULAR; INTRAVENOUS; SUBCUTANEOUS
Refills: 0 | Status: DISCONTINUED | OUTPATIENT
Start: 2024-06-05 | End: 2024-06-07

## 2024-06-05 RX ORDER — APREPITANT 80 MG/1
40 CAPSULE ORAL ONCE
Refills: 0 | Status: COMPLETED | OUTPATIENT
Start: 2024-06-05 | End: 2024-06-05

## 2024-06-05 RX ORDER — MAGNESIUM HYDROXIDE 400 MG/1
30 TABLET, CHEWABLE ORAL DAILY
Refills: 0 | Status: DISCONTINUED | OUTPATIENT
Start: 2024-06-05 | End: 2024-06-07

## 2024-06-05 RX ORDER — METOPROLOL TARTRATE 50 MG
1 TABLET ORAL
Refills: 0 | DISCHARGE

## 2024-06-05 RX ORDER — OXYCODONE HYDROCHLORIDE 5 MG/1
5 TABLET ORAL ONCE
Refills: 0 | Status: DISCONTINUED | OUTPATIENT
Start: 2024-06-05 | End: 2024-06-05

## 2024-06-05 RX ORDER — DEXAMETHASONE 0.5 MG/5ML
8 ELIXIR ORAL ONCE
Refills: 0 | Status: COMPLETED | OUTPATIENT
Start: 2024-06-06 | End: 2024-06-06

## 2024-06-05 RX ORDER — SODIUM CHLORIDE 9 MG/ML
1000 INJECTION, SOLUTION INTRAVENOUS
Refills: 0 | Status: DISCONTINUED | OUTPATIENT
Start: 2024-06-05 | End: 2024-06-07

## 2024-06-05 RX ORDER — CEFAZOLIN SODIUM 1 G
2000 VIAL (EA) INJECTION EVERY 8 HOURS
Refills: 0 | Status: COMPLETED | OUTPATIENT
Start: 2024-06-05 | End: 2024-06-06

## 2024-06-05 RX ORDER — ONDANSETRON 8 MG/1
4 TABLET, FILM COATED ORAL EVERY 6 HOURS
Refills: 0 | Status: DISCONTINUED | OUTPATIENT
Start: 2024-06-05 | End: 2024-06-07

## 2024-06-05 RX ORDER — ONDANSETRON 8 MG/1
4 TABLET, FILM COATED ORAL ONCE
Refills: 0 | Status: DISCONTINUED | OUTPATIENT
Start: 2024-06-05 | End: 2024-06-05

## 2024-06-05 RX ORDER — ACETAMINOPHEN 500 MG
1000 TABLET ORAL ONCE
Refills: 0 | Status: COMPLETED | OUTPATIENT
Start: 2024-06-05 | End: 2024-06-05

## 2024-06-05 RX ORDER — METOPROLOL TARTRATE 50 MG
50 TABLET ORAL DAILY
Refills: 0 | Status: DISCONTINUED | OUTPATIENT
Start: 2024-06-05 | End: 2024-06-07

## 2024-06-05 RX ORDER — TRANEXAMIC ACID 100 MG/ML
1000 INJECTION, SOLUTION INTRAVENOUS ONCE
Refills: 0 | Status: COMPLETED | OUTPATIENT
Start: 2024-06-05 | End: 2024-06-05

## 2024-06-05 RX ORDER — SODIUM CHLORIDE 9 MG/ML
1000 INJECTION, SOLUTION INTRAVENOUS
Refills: 0 | Status: DISCONTINUED | OUTPATIENT
Start: 2024-06-05 | End: 2024-06-05

## 2024-06-05 RX ORDER — HYDROMORPHONE HYDROCHLORIDE 2 MG/ML
0.5 INJECTION INTRAMUSCULAR; INTRAVENOUS; SUBCUTANEOUS
Refills: 0 | Status: DISCONTINUED | OUTPATIENT
Start: 2024-06-05 | End: 2024-06-05

## 2024-06-05 RX ORDER — VALSARTAN 80 MG/1
160 TABLET ORAL DAILY
Refills: 0 | Status: DISCONTINUED | OUTPATIENT
Start: 2024-06-07 | End: 2024-06-07

## 2024-06-05 RX ORDER — CEFAZOLIN SODIUM 1 G
2000 VIAL (EA) INJECTION ONCE
Refills: 0 | Status: COMPLETED | OUTPATIENT
Start: 2024-06-05 | End: 2024-06-05

## 2024-06-05 RX ORDER — SENNA PLUS 8.6 MG/1
2 TABLET ORAL AT BEDTIME
Refills: 0 | Status: DISCONTINUED | OUTPATIENT
Start: 2024-06-05 | End: 2024-06-07

## 2024-06-05 RX ORDER — APIXABAN 2.5 MG/1
2.5 TABLET, FILM COATED ORAL EVERY 12 HOURS
Refills: 0 | Status: DISCONTINUED | OUTPATIENT
Start: 2024-06-06 | End: 2024-06-07

## 2024-06-05 RX ORDER — POLYETHYLENE GLYCOL 3350 17 G/17G
17 POWDER, FOR SOLUTION ORAL AT BEDTIME
Refills: 0 | Status: DISCONTINUED | OUTPATIENT
Start: 2024-06-05 | End: 2024-06-07

## 2024-06-05 RX ORDER — CHLORHEXIDINE GLUCONATE 213 G/1000ML
1 SOLUTION TOPICAL ONCE
Refills: 0 | Status: COMPLETED | OUTPATIENT
Start: 2024-06-05 | End: 2024-06-05

## 2024-06-05 RX ORDER — OXYCODONE HYDROCHLORIDE 5 MG/1
5 TABLET ORAL
Refills: 0 | Status: DISCONTINUED | OUTPATIENT
Start: 2024-06-05 | End: 2024-06-07

## 2024-06-05 RX ADMIN — CELECOXIB 200 MILLIGRAM(S): 200 CAPSULE ORAL at 21:39

## 2024-06-05 RX ADMIN — Medication 1000 MILLIGRAM(S): at 23:58

## 2024-06-05 RX ADMIN — CELECOXIB 200 MILLIGRAM(S): 200 CAPSULE ORAL at 21:43

## 2024-06-05 RX ADMIN — Medication 1000 MILLIGRAM(S): at 17:43

## 2024-06-05 RX ADMIN — Medication 400 MILLIGRAM(S): at 16:18

## 2024-06-05 RX ADMIN — APREPITANT 40 MILLIGRAM(S): 80 CAPSULE ORAL at 07:58

## 2024-06-05 RX ADMIN — Medication 100 MILLIGRAM(S): at 17:08

## 2024-06-05 RX ADMIN — CHLORHEXIDINE GLUCONATE 1 APPLICATION(S): 213 SOLUTION TOPICAL at 07:58

## 2024-06-05 RX ADMIN — SODIUM CHLORIDE 100 MILLILITER(S): 9 INJECTION, SOLUTION INTRAVENOUS at 21:40

## 2024-06-05 RX ADMIN — SODIUM CHLORIDE 100 MILLILITER(S): 9 INJECTION, SOLUTION INTRAVENOUS at 16:17

## 2024-06-05 RX ADMIN — SENNA PLUS 2 TABLET(S): 8.6 TABLET ORAL at 21:39

## 2024-06-05 NOTE — DISCHARGE NOTE PROVIDER - INSTRUCTIONS
Pain medicine has been prescribed for you, as needed, and it often causes constipation.  Take docusate sodium (Colace) 100mg 3x daily, while taking narcotic pain medication.   For Constipation :   • Increase your water intake. Drink at least 8 glasses of water daily.  • Try adding fiber to your diet by eating fruits, vegetables and foods that are rich in grains.  • If you do experience constipation, you may take an over-the-counter laxative such as Senokot, Miralax or  Milk of Magnesia.

## 2024-06-05 NOTE — CONSULT NOTE ADULT - ASSESSMENT
83F presented for TKR - right    OA right knee now s/p TKR  Pain Management: acceptable- continue current care Tylenol ATC/Celebrex ATC/ Oxycodone PRN  Continue PT/OT  DVT proph: [ ] low risk - Aspirin  [ x ] high risk -Lovenox [  ] high risk - Eliquis [  ] other:_________  DC plan:  [x  ] Home with HC  [  ] Rehab   [  ] TBD  [  ]other:___________      HTN  Restart metoprolol POD #1 with hold parameters  Restart ARB pod #2 with hold parameters  hold diuretic for now.     d/w son at bedside

## 2024-06-05 NOTE — DISCHARGE NOTE PROVIDER - NSDCACTIVITY_GEN_ALL_CORE
Do not drive or operate machinery/Do not make important decisions/No heavy lifting/straining/Follow Instructions Provided by your Surgical Team Do not drive or operate machinery/Showering allowed/Stairs allowed/Walking - Indoors allowed/No heavy lifting/straining/Walking - Outdoors allowed/Follow Instructions Provided by your Surgical Team

## 2024-06-05 NOTE — DISCHARGE NOTE PROVIDER - NSDCCPTREATMENT_GEN_ALL_CORE_FT
PRINCIPAL PROCEDURE  Procedure: Arthroplasty, knee, right, total, robot-assisted  Findings and Treatment:

## 2024-06-05 NOTE — OCCUPATIONAL THERAPY INITIAL EVALUATION ADULT - GENERAL OBSERVATIONS, REHAB EVAL
Pt found in PACU supin bed SCD's, IV, cryocuff  tele, WILLIAM Pt found in PACU supine bed SCD's, IV, cryocuff  tele, WILLIAM

## 2024-06-05 NOTE — PHYSICAL THERAPY INITIAL EVALUATION ADULT - PHYSICAL ASSIST/NONPHYSICAL ASSIST: SIT/SUPINE, REHAB EVAL
Hpi Title: Evaluation of Skin Lesions
How Severe Are Your Spot(S)?: mild
Have Your Spot(S) Been Treated In The Past?: has not been treated
1 person assist

## 2024-06-05 NOTE — CONSULT NOTE ADULT - SUBJECTIVE AND OBJECTIVE BOX
Patient is a 83y old  Female who presents with a chief complaint of s/p total knee arthroplasty Right (05 Jun 2024 11:19)      HPI:  83F presented complains of right knee pain for many years for which she has tried HA injections without relief.  now s/p right TKR and patient is feeling well.  no complaints.  pain controlled.  tolerated full liquid diet.     REVIEW OF SYSTEMS:  CONSTITUTIONAL: No fever, weight loss, or fatigue  EYES: No eye pain, visual disturbances, or discharge  ENMT:  No difficulty hearing, tinnitus, vertigo; No sinus or throat pain  NECK: No pain or stiffness  BREASTS: No pain, masses, or nipple discharge  RESPIRATORY: No cough, wheezing, chills or hemoptysis; No shortness of breath  CARDIOVASCULAR: No chest pain, palpitations, dizziness, or leg swelling  GASTROINTESTINAL: No abdominal or epigastric pain. No nausea, vomiting, or hematemesis; No diarrhea or constipation. No melena or hematochezia.  GENITOURINARY: No dysuria, frequency, hematuria, or incontinence  NEUROLOGICAL: No headaches, memory loss, loss of strength, numbness, or tremors  SKIN: No itching, burning, rashes, or lesions   LYMPH NODES: No enlarged glands  ENDOCRINE: No heat or cold intolerance; No hair loss  MUSCULOSKELETAL: No muscle or back pain  PSYCHIATRIC: No depression, anxiety, mood swings, or difficulty sleeping  HEME/LYMPH: No easy bruising, or bleeding gums  ALLERGY AND IMMUNOLOGIC: No hives or eczema    PAST MEDICAL & SURGICAL HISTORY:  Hypertension    Breast cancer  right 2003    Osteoarthritis of right knee    History of lumpectomy of right breast  2003 - radiation also    S/P appendectomy  At Childhood    S/P tonsillectomy  At Childhood    S/P laparoscopic cholecystectomy    S/P carpal tunnel release  Right & Left ( April 2018 )    S/p bilateral shoulder joint replacement      SOCIAL HISTORY:  Residence: [ ] [facility]  [ ] [facility]  [ ] Community  [ ] Substance abuse: denies  [ ] Tobacco: denies  [ ] Alcohol use: denies    Allergies  penicillin (Hives)  amoxicillin (Hives)      MEDICATIONS  (STANDING):  lactated ringers. 1000 milliLiter(s) (100 mL/Hr) IV Continuous <Continuous>    MEDICATIONS  (PRN):  HYDROmorphone  Injectable 0.5 milliGRAM(s) IV Push every 10 minutes PRN Moderate Pain (4 - 6)  ondansetron Injectable 4 milliGRAM(s) IV Push once PRN Nausea and/or Vomiting  oxyCODONE    IR 5 milliGRAM(s) Oral once PRN Moderate Pain (4 - 6)      FAMILY HISTORY:  Diabetes mellitus (Sibling)  brother    Family history of breast cancer in sister (Sibling)    Family history of heart disease (Father, Mother)        Vital Signs Last 24 Hrs  T(C): 36.8 (05 Jun 2024 12:30), Max: 36.8 (05 Jun 2024 12:30)  T(F): 98.2 (05 Jun 2024 12:30), Max: 98.2 (05 Jun 2024 12:30)  HR: 60 (05 Jun 2024 13:00) (52 - 68)  BP: 151/54 (05 Jun 2024 13:00) (95/72 - 158/60)  BP(mean): --  RR: 18 (05 Jun 2024 13:00) (12 - 20)  SpO2: 100% (05 Jun 2024 13:00) (97% - 100%)    Parameters below as of 05 Jun 2024 13:00  Patient On (Oxygen Delivery Method): room air        PHYSICAL EXAM:    GENERAL: NAD, well-groomed, well-developed  HEAD:  Atraumatic, Normocephalic  EYES: conjunctiva and sclera clear  ENMT: Moist mucous membranes  NECK: Supple, No JVD  NERVOUS SYSTEM:  Alert & Oriented X3, Good concentration; Moving all 4 extremities; No gross sensory deficits  CHEST/LUNG: Clear to auscultation bilaterally;   HEART: Regular rate and rhythm;   ABDOMEN: Soft, Nontender, Nondistended; Bowel sounds present  EXTREMITIES:  2+ Peripheral Pulses, No clubbing, cyanosis, or edema  LYMPH: No lymphadenopathy noted  /RECTAL: Not examined  BREAST: Not examined  SKIN: No rashes or lesions  INCISION: dressing dry and intact    LABS:              CAPILLARY BLOOD GLUCOSE          RADIOLOGY & ADDITIONAL STUDIES:    EKG:   Personally Reviewed:  [ ] YES     Imaging: TKR in place  Personally Reviewed:  [x ] YES     Consultant(s) Notes Reviewed:  preop med and cardio clearances reviewed    Care Discussed with Consultants/Other Providers:

## 2024-06-05 NOTE — DISCHARGE NOTE PROVIDER - CARE PROVIDER_API CALL
Blue Phillips Gordon  Orthopaedic Surgery  [address] [place] [id number]  Phone: [phone]  Fax: [phone]  Established Patient  Scheduled Appointment: 06/18/2024 11:45 AM

## 2024-06-05 NOTE — DISCHARGE NOTE PROVIDER - NSDCMRMEDTOKEN_GEN_ALL_CORE_FT
biotin 10,000 mcg oral capsule: 1 cap(s) orally once a day  Centrum Silver Women&#x27;s oral tablet: 1 tab(s) orally once a day  D3 25 mcg (1000 intl units) oral tablet: 1 tab(s) orally once a day  metoprolol succinate 50 mg oral capsule, extended release: 1 cap(s) orally once a day  Probiotic: 1 tab(s) orally once a day  valsartan-hydrochlorothiazide 160 mg-12.5 mg oral tablet: 1 tab(s) orally once a day   acetaminophen 500 mg oral tablet: 2 tab(s) orally every 8 hours  Aspirin EC 81 mg oral delayed release tablet: 1 tab(s) orally every 12 hours after completion of Eliquis therapy. Take at least 2 hours before celebrex/meloxicam  biotin 10,000 mcg oral capsule: 1 cap(s) orally once a day  CeleBREX 200 mg oral capsule: 1 cap(s) orally every 12 hours Take at lest 2 hours after aspirin  Centrum Silver Women&#x27;s oral tablet: 1 tab(s) orally once a day  D3 25 mcg (1000 intl units) oral tablet: 1 tab(s) orally once a day  Eliquis 2.5 mg oral tablet: 1 tab(s) orally every 12 hours  metoprolol succinate 50 mg oral capsule, extended release: 1 cap(s) orally once a day  omeprazole 20 mg oral delayed release capsule: 1 cap(s) orally once a day  oxyCODONE 5 mg oral tablet: 1 tab(s) orally every 4 hours as needed for  moderate pain May take 2 tabs for severe pain MDD: 6  polyethylene glycol 3350 oral powder for reconstitution: 17 gram(s) orally once a day (at bedtime) as needed for  constipation  Probiotic: 1 tab(s) orally once a day  senna leaf extract oral tablet: 2 tab(s) orally once a day (at bedtime) as needed for  constipation  valsartan-hydrochlorothiazide 160 mg-12.5 mg oral tablet: 1 tab(s) orally once a day

## 2024-06-05 NOTE — DISCHARGE NOTE PROVIDER - NSDCFUSCHEDAPPT_GEN_ALL_CORE_FT
Blue Phillips  [facility]  [address]  Scheduled Appointment: 06/05/2024    Blue Phillips  [place] [facility]  [address]  Scheduled Appointment: 06/18/2024     Blue Phillips  [facility] Physician [facility]  [address] [facility]  Scheduled Appointment: 06/18/2024

## 2024-06-05 NOTE — OCCUPATIONAL THERAPY INITIAL EVALUATION ADULT - ADDITIONAL COMMENTS
Pt lives in a private home 4-5 [address]A +railing + tub with curtain +grab bars Pt has comfort ht toilet and does not need a commode. Pt owns a RW

## 2024-06-05 NOTE — PATIENT PROFILE ADULT - FUNCTIONAL SCREEN CURRENT LEVEL: COMMUNICATION, MLM
Patient requesting script for Levsin for discharge. RN paged GARRETT Brandon to request.  Oseas Squires agreed to call script in to [facility] outpatient pharmacy for . Patient requesting all discharge scripts be filled at [facility] outpatient pharmacy. Written and verbal discharge instructions reviewed with patient. Patient confirmed understanding with adequate teach back. MINA removed per order. IV x2 removed per protocol. Belongings with patient at time of discharge. 0 = understands/communicates without difficulty

## 2024-06-05 NOTE — DISCHARGE NOTE PROVIDER - HOSPITAL COURSE
This patient was admitted to [facility] with a history of severe degenerative joint disease of the right knee.  Patient underwent Pre-Surgical Testing and was medically cleared to undergo elective right total knee replacement procedure. Patient underwent a RIGHT total knee replacement by Dr. Phillips on 6/5/24. Procedure was well tolerated.  No operative or veronica-operative complications arose during patient's hospital course.  Patient received antibiotic according to SCIP guidelines for infection prevention.  Eliquis 2.5mg q12hr was given for DVT prophylaxis, in addition to the use of SCDs.  Anesthesia, Medical Hospitalist, Physical Therapy and Occupational Therapy were consulted. Patient is stable for discharge with a good prognosis.  Appropriate discharge instructions and medications are provided in this document. The patient will be discharged with home care (skilled RN/PT/OT). This patient was admitted to [facility] with a history of severe degenerative joint disease of the right knee.  Patient underwent Pre-Surgical Testing and was medically cleared to undergo elective right total knee replacement procedure. Patient underwent a RIGHT total knee replacement by Dr. Phillips on 6/5/24. Procedure was well tolerated.  No operative or veronica-operative complications arose during patient's hospital course.  Patient received antibiotic according to SCIP guidelines for infection prevention.  Eliquis 2.5mg q12hr was given for DVT prophylaxis, in addition to the use of SCDs.  Anesthesia, Medical Hospitalist, Physical Therapy and Occupational Therapy were consulted. Patient is stable for discharge with a good prognosis.  Appropriate discharge instructions and medications are provided in this document. Patient going home with home care PT.

## 2024-06-05 NOTE — PHYSICAL THERAPY INITIAL EVALUATION ADULT - ADDITIONAL COMMENTS
Pt lives with son in a private house, there are 4 stairs +HR to enter and no stairs to negotiate within. Pt has a tub shower, +grab bars. PTA pt was independent with ADLs and ambulation. Pt owns a RW.

## 2024-06-05 NOTE — PHYSICAL THERAPY INITIAL EVALUATION ADULT - GAIT DEVIATIONS NOTED, PT EVAL
decreased richie/increased time in double stance/decreased step length/decreased weight-shifting ability

## 2024-06-05 NOTE — DISCHARGE NOTE PROVIDER - PROVIDER TOKENS
PROVIDER:[TOKEN:[[id number]],SCHEDULEDAPPT:[06/18/2024],SCHEDULEDAPPTTIME:[11:45 AM],ESTABLISHEDPATIENT:[T]]

## 2024-06-05 NOTE — DISCHARGE NOTE PROVIDER - NSDCCPCAREPLAN_GEN_ALL_CORE_FT
PRINCIPAL DISCHARGE DIAGNOSIS  Diagnosis: Primary osteoarthritis of right knee  Assessment and Plan of Treatment: Physical Therapy/Occupational Therapy for: ambulation, transfers, stairs, ADL's (activities of daily living), and range of motion exercises  -Activity  • Weight Bearing as tolerated with rolling walker.  • Take short, frequent walks increasing the distance that you walk each day as tolerated.  • Change your position every hour to decrease pain and stiffness.  • Continue the exercises taught to you by your physical therapist.  • No driving until cleared by the doctor.  • No tub baths, hot tubs, or swimming pools until instructed by your doctor.  • Do not squat down on the floor.  • Do not kneel or twist your knee.  • Range of Motion Goals: Flexion= 120 degrees, Extension = 0 degrees  You have a WILLIAM dressing. You may shower. Disconnect WILLIAM battery prior to showering. Reconnect battery after showering and press orange button to resume WILLIAM power. Remove WILLIAM dressing on post-op day #7.  Keep incision clean. DO NOT APPLY ANYTHING to incision site (salves/ointments/creams). Do not scrub incision site. Pat dry after shower.  Apply cryocuff to operative knee for 20 minutes at a time, several times per day, with at least 1 hour break in between sessions-place barrier between cryocuff and skin.  Follow up with your primary care physician within 2-3 weeks of discharge home

## 2024-06-05 NOTE — BRIEF OPERATIVE NOTE - NSICDXBRIEFPROCEDURE_GEN_ALL_CORE_FT
PROCEDURES:  Arthroplasty, knee, right, total, robot-assisted 05-Jun-2024 10:58:57  Alexa Saldana A

## 2024-06-06 ENCOUNTER — TRANSCRIPTION ENCOUNTER (OUTPATIENT)
Age: 84
End: 2024-06-06

## 2024-06-06 LAB
ANION GAP SERPL CALC-SCNC: 7 MMOL/L — SIGNIFICANT CHANGE UP (ref 5–17)
BUN SERPL-MCNC: 30 MG/DL — HIGH (ref 7–23)
CALCIUM SERPL-MCNC: 8.6 MG/DL — SIGNIFICANT CHANGE UP (ref 8.4–10.5)
CHLORIDE SERPL-SCNC: 104 MMOL/L — SIGNIFICANT CHANGE UP (ref 96–108)
CO2 SERPL-SCNC: 26 MMOL/L — SIGNIFICANT CHANGE UP (ref 22–31)
CREAT SERPL-MCNC: 1.34 MG/DL — HIGH (ref 0.5–1.3)
EGFR: 39 ML/MIN/1.73M2 — LOW
GLUCOSE SERPL-MCNC: 137 MG/DL — HIGH (ref 70–99)
HCT VFR BLD CALC: 29.3 % — LOW (ref 34.5–45)
HGB BLD-MCNC: 9.9 G/DL — LOW (ref 11.5–15.5)
MCHC RBC-ENTMCNC: 31.5 PG — SIGNIFICANT CHANGE UP (ref 27–34)
MCHC RBC-ENTMCNC: 33.8 GM/DL — SIGNIFICANT CHANGE UP (ref 32–36)
MCV RBC AUTO: 93.3 FL — SIGNIFICANT CHANGE UP (ref 80–100)
NRBC # BLD: 0 /100 WBCS — SIGNIFICANT CHANGE UP (ref 0–0)
PLATELET # BLD AUTO: 169 K/UL — SIGNIFICANT CHANGE UP (ref 150–400)
POTASSIUM SERPL-MCNC: 5.1 MMOL/L — SIGNIFICANT CHANGE UP (ref 3.5–5.3)
POTASSIUM SERPL-SCNC: 5.1 MMOL/L — SIGNIFICANT CHANGE UP (ref 3.5–5.3)
RBC # BLD: 3.14 M/UL — LOW (ref 3.8–5.2)
RBC # FLD: 12.8 % — SIGNIFICANT CHANGE UP (ref 10.3–14.5)
SODIUM SERPL-SCNC: 137 MMOL/L — SIGNIFICANT CHANGE UP (ref 135–145)
WBC # BLD: 11.26 K/UL — HIGH (ref 3.8–10.5)
WBC # FLD AUTO: 11.26 K/UL — HIGH (ref 3.8–10.5)

## 2024-06-06 PROCEDURE — 99232 SBSQ HOSP IP/OBS MODERATE 35: CPT

## 2024-06-06 RX ORDER — APIXABAN 2.5 MG/1
1 TABLET, FILM COATED ORAL
Qty: 27 | Refills: 0
Start: 2024-06-06 | End: 2024-06-19

## 2024-06-06 RX ORDER — OXYCODONE HYDROCHLORIDE 5 MG/1
1 TABLET ORAL
Qty: 42 | Refills: 0
Start: 2024-06-06 | End: 2024-06-12

## 2024-06-06 RX ORDER — OMEPRAZOLE 10 MG/1
1 CAPSULE, DELAYED RELEASE ORAL
Qty: 30 | Refills: 0
Start: 2024-06-06 | End: 2024-07-05

## 2024-06-06 RX ORDER — CELECOXIB 200 MG/1
1 CAPSULE ORAL
Qty: 60 | Refills: 0
Start: 2024-06-06 | End: 2024-07-05

## 2024-06-06 RX ORDER — SENNA PLUS 8.6 MG/1
2 TABLET ORAL
Qty: 0 | Refills: 0 | DISCHARGE
Start: 2024-06-06

## 2024-06-06 RX ORDER — ACETAMINOPHEN 500 MG
2 TABLET ORAL
Qty: 0 | Refills: 0 | DISCHARGE
Start: 2024-06-06

## 2024-06-06 RX ORDER — POLYETHYLENE GLYCOL 3350 17 G/17G
17 POWDER, FOR SOLUTION ORAL
Qty: 0 | Refills: 0 | DISCHARGE
Start: 2024-06-06

## 2024-06-06 RX ORDER — ASPIRIN/CALCIUM CARB/MAGNESIUM 324 MG
1 TABLET ORAL
Qty: 28 | Refills: 0
Start: 2024-06-06 | End: 2024-06-19

## 2024-06-06 RX ADMIN — Medication 1000 MILLIGRAM(S): at 11:30

## 2024-06-06 RX ADMIN — Medication 1000 MILLIGRAM(S): at 09:53

## 2024-06-06 RX ADMIN — CELECOXIB 200 MILLIGRAM(S): 200 CAPSULE ORAL at 22:08

## 2024-06-06 RX ADMIN — SENNA PLUS 2 TABLET(S): 8.6 TABLET ORAL at 22:08

## 2024-06-06 RX ADMIN — Medication 50 MILLIGRAM(S): at 06:17

## 2024-06-06 RX ADMIN — Medication 1000 MILLIGRAM(S): at 00:02

## 2024-06-06 RX ADMIN — Medication 1000 MILLIGRAM(S): at 23:56

## 2024-06-06 RX ADMIN — APIXABAN 2.5 MILLIGRAM(S): 2.5 TABLET, FILM COATED ORAL at 09:23

## 2024-06-06 RX ADMIN — CELECOXIB 200 MILLIGRAM(S): 200 CAPSULE ORAL at 09:53

## 2024-06-06 RX ADMIN — Medication 1000 MILLIGRAM(S): at 09:23

## 2024-06-06 RX ADMIN — APIXABAN 2.5 MILLIGRAM(S): 2.5 TABLET, FILM COATED ORAL at 22:08

## 2024-06-06 RX ADMIN — Medication 1000 MILLIGRAM(S): at 17:28

## 2024-06-06 RX ADMIN — Medication 1000 MILLIGRAM(S): at 16:58

## 2024-06-06 RX ADMIN — Medication 101.6 MILLIGRAM(S): at 06:14

## 2024-06-06 RX ADMIN — SODIUM CHLORIDE 100 MILLILITER(S): 9 INJECTION, SOLUTION INTRAVENOUS at 06:37

## 2024-06-06 RX ADMIN — SODIUM CHLORIDE 100 MILLILITER(S): 9 INJECTION, SOLUTION INTRAVENOUS at 00:29

## 2024-06-06 RX ADMIN — Medication 100 MILLIGRAM(S): at 00:27

## 2024-06-06 RX ADMIN — PANTOPRAZOLE SODIUM 40 MILLIGRAM(S): 20 TABLET, DELAYED RELEASE ORAL at 06:14

## 2024-06-06 RX ADMIN — CELECOXIB 200 MILLIGRAM(S): 200 CAPSULE ORAL at 22:10

## 2024-06-06 RX ADMIN — CELECOXIB 200 MILLIGRAM(S): 200 CAPSULE ORAL at 09:23

## 2024-06-06 NOTE — CARE COORDINATION ASSESSMENT. - NSDCPLANSERVICES_GEN_ALL_CORE
CM met with patient and her son Shan  at bedside.   Patient is agreeable to discuss transition of care with her son in the room. Patient. A&O x 4. Pt s/p  PROCEDURES: Total right knee replacement .   Pt  resting comfortably / Pt has no complaints at this time.  CM explained role of CM and availability to assist with discharge planning throughout stay.   Provided CM contact information and pt. verbalized understanding. Patient lives in a private house with her son, 4 steps to enter. Prior to surgery patient was ind in adls.  Patient identified her son  as her caregiver at home who will assist her during her recuperation and will transport her home and to MD appointments.   Pt. is agreeable with PT/OT's recommendations for d/c plan to home with HC/PT.  CM explained home care expectations, process, insurance provisions and home safety with good understanding.   Offered list of [facility] and pt. chose [facility] at home care agency.  Provided discharge resources folder. CM made referral accordingly.  Informed them of Anticipated  DC date for today 6/6/2024 and for SOC tomorrow 6/7/2024.  Patient provided with info on the transitional care management/health solutions team who will be following her upon DC.    Noted pt has a WILLIAM drsg in place/  Educational flyer provided and reviewed with the patient.  Pt verbalizing understanding and in agreement with d/c plans.  DME: Pt has a RW, at home.  PCP: Dr Kai Vargas   Pt stated she will be receiving meds to bed from [facility] pharmacy prior to DC./Home Care

## 2024-06-06 NOTE — CARE COORDINATION ASSESSMENT. - NSCAREPROVIDERS_GEN_ALL_CORE_FT
CARE PROVIDERS:  Admitting: Blue Phillips  Attending: Blue Phillips  Consultant: Greta Scott  Consultant: Prabhakar Garsia  Covering Team: Luis Felipe Harris  Infection Control: Kristen Vance  Nurse: Sonya Delvalle  Nurse: Fannie Kim  Nurse: Dwayne Carlson  Occupational Therapy: Myron Henry  Occupational Therapy: Tabatha Landaverde  PCA/Nursing Assistant: Kesha Cummings  Physical Therapy: Job Doran  Physical Therapy: Zamzam Askew  Primary Team: Zhen Esquivel  Primary Team: Eb Linares  Primary Team: Lilliana Young  Primary Team: Lowell Murphy  Primary Team: Alexa Saldana  Registered Dietitian: Mary Campuzano  Respiratory Therapy: Dat Cody  Team: DEEPALI  Hospitalists, Team  UR// Supp. Assoc.: Shirley Siddiqi

## 2024-06-06 NOTE — PATIENT CHOICE NOTE. - NSPTCHOICESTATE_GEN_ALL_CORE

## 2024-06-06 NOTE — DISCHARGE NOTE NURSING/CASE MANAGEMENT/SOCIAL WORK - NURSING SECTION COMPLETE
· Seizure disorder follows with neurology as outpatient on four agents  · Continue lamotrigine clonazepam zonisamide and primidone as taken  · Stable with no recent seizures reported Patient/Caregiver provided printed discharge information.

## 2024-06-06 NOTE — DISCHARGE NOTE NURSING/CASE MANAGEMENT/SOCIAL WORK - NSDCPEFALRISK_GEN_ALL_CORE
For information on Fall & Injury Prevention, visit: [email or web address] OR  [email or web address] OR  https://www.cdc.gov/steadi/patient.html

## 2024-06-06 NOTE — PHARMACOTHERAPY INTERVENTION NOTE - COMMENTS
Admission medication reconciliation POD1  
Patient tolerated cefazolin perioperatively despite reported allergy to penicillin. Profile updated.  
Transition of Care video discharge education - medication calendar given to patient  faustina dong PharmD Candidate for Aaliyah Cabrera, PharmD 
Patient tolerated cefazolin perioperatively despite reported allergy to penicillin. Profile updated.

## 2024-06-06 NOTE — DISCHARGE NOTE NURSING/CASE MANAGEMENT/SOCIAL WORK - PATIENT PORTAL LINK FT
You can access the FollowMyHealth Patient Portal offered by [facility] by registering at the following website: [email or web address]. By joining [facility]’s FollowMyHealth portal, you will also be able to view your health information using other applications (apps) compatible with our system.

## 2024-06-06 NOTE — DISCHARGE NOTE NURSING/CASE MANAGEMENT/SOCIAL WORK - NSSCNAMETXT_GEN_ALL_CORE
[facility] care agency [phone] will reach out to you within 24-72 hours of your discharge to schedule home care visit/eval appointment with you. Please call agency for any queries regarding home care services

## 2024-06-06 NOTE — CARE COORDINATION ASSESSMENT. - NSPASTMEDSURGHISTORY_GEN_ALL_CORE_FT
PAST MEDICAL & SURGICAL HISTORY:  Breast cancer  right 2003      Hypertension      S/P laparoscopic cholecystectomy      S/P tonsillectomy  At Childhood      S/P appendectomy  At Childhood      History of lumpectomy of right breast  2003 - radiation also      S/P carpal tunnel release  Right & Left ( April 2018 )      Osteoarthritis of right knee      S/p bilateral shoulder joint replacement

## 2024-06-07 VITALS
RESPIRATION RATE: 16 BRPM | TEMPERATURE: 98 F | HEART RATE: 72 BPM | DIASTOLIC BLOOD PRESSURE: 79 MMHG | SYSTOLIC BLOOD PRESSURE: 129 MMHG | OXYGEN SATURATION: 95 %

## 2024-06-07 PROCEDURE — C1776: CPT

## 2024-06-07 PROCEDURE — 97530 THERAPEUTIC ACTIVITIES: CPT

## 2024-06-07 PROCEDURE — 97161 PT EVAL LOW COMPLEX 20 MIN: CPT

## 2024-06-07 PROCEDURE — 99231 SBSQ HOSP IP/OBS SF/LOW 25: CPT

## 2024-06-07 PROCEDURE — 97165 OT EVAL LOW COMPLEX 30 MIN: CPT

## 2024-06-07 PROCEDURE — 80048 BASIC METABOLIC PNL TOTAL CA: CPT

## 2024-06-07 PROCEDURE — 97535 SELF CARE MNGMENT TRAINING: CPT

## 2024-06-07 PROCEDURE — 36415 COLL VENOUS BLD VENIPUNCTURE: CPT

## 2024-06-07 PROCEDURE — 73560 X-RAY EXAM OF KNEE 1 OR 2: CPT

## 2024-06-07 PROCEDURE — S2900: CPT

## 2024-06-07 PROCEDURE — 97110 THERAPEUTIC EXERCISES: CPT

## 2024-06-07 PROCEDURE — 97116 GAIT TRAINING THERAPY: CPT

## 2024-06-07 PROCEDURE — 85027 COMPLETE CBC AUTOMATED: CPT

## 2024-06-07 PROCEDURE — C1713: CPT

## 2024-06-07 RX ADMIN — Medication 1000 MILLIGRAM(S): at 08:18

## 2024-06-07 RX ADMIN — PANTOPRAZOLE SODIUM 40 MILLIGRAM(S): 20 TABLET, DELAYED RELEASE ORAL at 05:46

## 2024-06-07 RX ADMIN — APIXABAN 2.5 MILLIGRAM(S): 2.5 TABLET, FILM COATED ORAL at 08:18

## 2024-06-07 RX ADMIN — VALSARTAN 160 MILLIGRAM(S): 80 TABLET ORAL at 05:45

## 2024-06-07 RX ADMIN — CELECOXIB 200 MILLIGRAM(S): 200 CAPSULE ORAL at 08:18

## 2024-06-07 RX ADMIN — Medication 50 MILLIGRAM(S): at 05:45

## 2024-06-07 NOTE — PROGRESS NOTE ADULT - SUBJECTIVE AND OBJECTIVE BOX
Discharge medication calendar:  Eliquis 2.5mg q12h x 2 weeks then ASA EC 81mg q12h x 2 weeks  Omeprazole 20mg QAM x 4 weeks  Celecoxib 200mg q12h x 2-3 weeks  APAP 1000mg q8h x 2-3 weeks  Narcotic PRN  Docusate 100mg TID while taking narcotic  Miralax, Senna, or Bisacodyl PRN for treatment of constipation  
POST OPERATIVE DAY #:  [ 2]   STATUS POST: [ ] Left [x ] Right  [ x]TKR [ ]THR                        Patient is a 83y old  Female who presents with a chief complaint of s/p total knee arthroplasty Right (05 Jun 2024 11:19)      Pain well controlled    OBJECTIVE:     Vital Signs Last 24 Hrs  T(C): 36.4 (07 Jun 2024 03:30), Max: 36.7 (06 Jun 2024 15:00)  T(F): 97.6 (07 Jun 2024 03:30), Max: 98 (06 Jun 2024 15:00)  HR: 66 (07 Jun 2024 05:48) (55 - 66)  BP: 136/80 (07 Jun 2024 05:48) (106/51 - 136/80)  BP(mean): --  RR: 18 (07 Jun 2024 03:30) (18 - 18)  SpO2: 94% (07 Jun 2024 03:30) (94% - 97%)        Affected extremity:         noelle Dressing:  clean/dry/intact          Sensation; intact to light touch  [ ] decreased:          Motor exam: Toes warm and mobile well perfused;  +cap        No calf tenderness bilateral LE's    LABS:                        9.9    11.26 )-----------( 169      ( 06 Jun 2024 06:00 )             29.3     06-06    137  |  104  |  30<H>  ----------------------------<  137<H>  5.1   |  26  |  1.34<H>    Ca    8.6      06 Jun 2024 06:00              A/P :    -    Analgesics PRN  -    DVT ppx: [ ]ASA 81 bid [ ] Lovenox [ ] Coumadin   [ x] Eliquis   -    Check AM labs  -    Weight bearing status: WBAT [ x]        PWB    [ ]     TTWB  [ ]      NWB  [ ]  -    Physical Therapy  -    Occupational Therapy  -    Dispo: Home [x ]     Rehab [ ]      BLAKE [ ]      To be determined [ ]  
Patient is a 83y old  Female who presents with a chief complaint of s/p total knee arthroplasty Right (05 Jun 2024 11:19)      INTERVAL HPI/OVERNIGHT EVENTS:  feeling well, +small BM this am.  pain controlled.     MEDICATIONS  (STANDING):  acetaminophen     Tablet .. 1000 milliGRAM(s) Oral every 8 hours  apixaban 2.5 milliGRAM(s) Oral every 12 hours  celecoxib 200 milliGRAM(s) Oral every 12 hours  lactated ringers. 1000 milliLiter(s) (100 mL/Hr) IV Continuous <Continuous>  metoprolol succinate ER 50 milliGRAM(s) Oral daily  pantoprazole    Tablet 40 milliGRAM(s) Oral before breakfast  polyethylene glycol 3350 17 Gram(s) Oral at bedtime  senna 2 Tablet(s) Oral at bedtime  valsartan 160 milliGRAM(s) Oral daily    MEDICATIONS  (PRN):  bisacodyl Suppository 10 milliGRAM(s) Rectal once PRN Constipation  HYDROmorphone  Injectable 0.5 milliGRAM(s) IV Push every 3 hours PRN Breakthrough pain  magnesium hydroxide Suspension 30 milliLiter(s) Oral daily PRN Constipation  ondansetron Injectable 4 milliGRAM(s) IV Push every 6 hours PRN Nausea and/or Vomiting  oxyCODONE    IR 5 milliGRAM(s) Oral every 3 hours PRN Moderate Pain (4 - 6)  oxyCODONE    IR 10 milliGRAM(s) Oral every 3 hours PRN Severe Pain (7 - 10)      Allergies  penicillin (Hives)  amoxicillin (Hives)      REVIEW OF SYSTEMS:  CONSTITUTIONAL: No fever, weight loss, or fatigue  EYES: No eye pain, visual disturbances, or discharge  ENMT:  No difficulty hearing, tinnitus, vertigo; No sinus or throat pain  NECK: No pain or stiffness  BREASTS: No pain, masses, or nipple discharge  RESPIRATORY: No cough, wheezing, chills or hemoptysis; No shortness of breath  CARDIOVASCULAR: No chest pain, palpitations, or lightheadedness  GASTROINTESTINAL: No abdominal or epigastric pain. No nausea, vomiting, or hematemesis; No diarrhea or constipation. No melena or hematochezia.  GENITOURINARY: No dysuria, frequency, hematuria, or incontinence  NEUROLOGICAL: No headaches, vertigo, memory loss, loss of strength, numbness, or tremors  SKIN: No itching, burning, rashes, or lesions   LYMPH NODES: No enlarged glands  ENDOCRINE: No heat or cold intolerance; No hair loss; No polydipsia or polyuria  MUSCULOSKELETAL: No back pain  PSYCHIATRIC: No depression, anxiety, or mood swings  HEME/LYMPH: No easy bruising, or bleeding gums  ALLERGY AND IMMUNOLOGIC: No hives or eczema    Vital Signs Last 24 Hrs  T(C): 36.8 (07 Jun 2024 08:28), Max: 36.8 (07 Jun 2024 08:28)  T(F): 98.2 (07 Jun 2024 08:28), Max: 98.2 (07 Jun 2024 08:28)  HR: 72 (07 Jun 2024 08:28) (55 - 72)  BP: 129/79 (07 Jun 2024 08:28) (106/51 - 136/80)  BP(mean): --  RR: 16 (07 Jun 2024 08:28) (16 - 18)  SpO2: 95% (07 Jun 2024 08:28) (94% - 97%)        PHYSICAL EXAM:  GENERAL: NAD, well-groomed, well-developed  HEAD:  Atraumatic, Normocephalic  EYES: conjunctiva and sclera clear  ENMT: Moist mucous membranes  NECK: Supple, No JVD, Normal thyroid  NERVOUS SYSTEM:  Alert & Oriented X3, Good concentration; Bilateral LE mobile, sensation to light touch intact  CHEST/LUNG: Clear to auscultation bilaterally;  HEART: Regular rate and rhythm;   ABDOMEN: Soft, Nontender, Nondistended; Bowel sounds present  EXTREMITIES:  2+ Peripheral Pulses, No clubbing or cyanosis  LYMPH: No lymphadenopathy noted  SKIN: No rashes or lesions  INCISION:  Dressing dry and intact    LABS:      Ca    8.6        06 Jun 2024 06:00        Urinalysis Basic - ( 06 Jun 2024 06:00 )    Color: x / Appearance: x / SG: x / pH: x  Gluc: 137 mg/dL / Ketone: x  / Bili: x / Urobili: x   Blood: x / Protein: x / Nitrite: x   Leuk Esterase: x / RBC: x / WBC x   Sq Epi: x / Non Sq Epi: x / Bacteria: x      CAPILLARY BLOOD GLUCOSE          RADIOLOGY & ADDITIONAL TESTS:    Imaging Personally Reviewed:      [ ] Consultant(s) Notes Reviewed  [x] Care Discussed with Consultants/Other Providers:  Ortho PA- plan of care
Patient is a 83y old  Female who presents with a chief complaint of s/p total knee arthroplasty Right (05 Jun 2024 11:19)      INTERVAL HPI/OVERNIGHT EVENTS:  feeling well, pain controlled.  knee feels weak.     MEDICATIONS  (STANDING):  acetaminophen     Tablet .. 1000 milliGRAM(s) Oral every 8 hours  apixaban 2.5 milliGRAM(s) Oral every 12 hours  celecoxib 200 milliGRAM(s) Oral every 12 hours  lactated ringers. 1000 milliLiter(s) (100 mL/Hr) IV Continuous <Continuous>  metoprolol succinate ER 50 milliGRAM(s) Oral daily  pantoprazole    Tablet 40 milliGRAM(s) Oral before breakfast  polyethylene glycol 3350 17 Gram(s) Oral at bedtime  senna 2 Tablet(s) Oral at bedtime    MEDICATIONS  (PRN):  HYDROmorphone  Injectable 0.5 milliGRAM(s) IV Push every 3 hours PRN Breakthrough pain  magnesium hydroxide Suspension 30 milliLiter(s) Oral daily PRN Constipation  ondansetron Injectable 4 milliGRAM(s) IV Push every 6 hours PRN Nausea and/or Vomiting  oxyCODONE    IR 5 milliGRAM(s) Oral every 3 hours PRN Moderate Pain (4 - 6)  oxyCODONE    IR 10 milliGRAM(s) Oral every 3 hours PRN Severe Pain (7 - 10)      Allergies    penicillin (Hives)  amoxicillin (Hives)    Intolerances        REVIEW OF SYSTEMS:  CONSTITUTIONAL: No fever, weight loss, or fatigue  EYES: No eye pain, visual disturbances, or discharge  ENMT:  No difficulty hearing, tinnitus, vertigo; No sinus or throat pain  NECK: No pain or stiffness  BREASTS: No pain, masses, or nipple discharge  RESPIRATORY: No cough, wheezing, chills or hemoptysis; No shortness of breath  CARDIOVASCULAR: No chest pain, palpitations, or lightheadedness  GASTROINTESTINAL: No abdominal or epigastric pain. No nausea, vomiting, or hematemesis; No diarrhea or constipation. No melena or hematochezia.  GENITOURINARY: No dysuria, frequency, hematuria, or incontinence  NEUROLOGICAL: No headaches, vertigo, memory loss, loss of strength, numbness, or tremors  SKIN: No itching, burning, rashes, or lesions   LYMPH NODES: No enlarged glands  ENDOCRINE: No heat or cold intolerance; No hair loss; No polydipsia or polyuria  MUSCULOSKELETAL: No back pain  PSYCHIATRIC: No depression, anxiety, or mood swings  HEME/LYMPH: No easy bruising, or bleeding gums  ALLERGY AND IMMUNOLOGIC: No hives or eczema    Vital Signs Last 24 Hrs  T(C): 36.4 (06 Jun 2024 08:04), Max: 36.6 (05 Jun 2024 14:35)  T(F): 97.6 (06 Jun 2024 08:04), Max: 97.8 (05 Jun 2024 14:35)  HR: 96 (06 Jun 2024 08:04) (50 - 96)  BP: 123/48 (06 Jun 2024 08:04) (101/61 - 151/54)  BP(mean): --  RR: 18 (06 Jun 2024 08:04) (15 - 19)  SpO2: 91% (06 Jun 2024 08:04) (91% - 99%)    Parameters below as of 06 Jun 2024 08:04  Patient On (Oxygen Delivery Method): room air        PHYSICAL EXAM:  GENERAL: NAD, well-groomed, well-developed  HEAD:  Atraumatic, Normocephalic  EYES: EOMI, PERRLA, conjunctiva and sclera clear  ENMT: Moist mucous membranes, Good dentition, No lesions; No tonsillar erythema, exudates, or enlargement   NECK: Supple, No JVD, Normal thyroid  NERVOUS SYSTEM:  Alert & Oriented X3, Good concentration; Bilateral LE mobile, sensation to light touch intact  CHEST/LUNG: Clear to auscultation bilaterally; No rales, rhonchi, wheezing, or rubs  HEART: Regular rate and rhythm; No murmurs, rubs, or gallops  ABDOMEN: Soft, Nontender, Nondistended; Bowel sounds present  EXTREMITIES:  2+ Peripheral Pulses, No clubbing or cyanosis  LYMPH: No lymphadenopathy noted  SKIN: No rashes or lesions  INCISION:  Dressing dry and intact    LABS:                        9.9    11.26 )-----------( 169      ( 06 Jun 2024 06:00 )             29.3     06 Jun 2024 06:00    137    |  104    |  30     ----------------------------<  137    5.1     |  26     |  1.34     Ca    8.6        06 Jun 2024 06:00        Urinalysis Basic - ( 06 Jun 2024 06:00 )    Color: x / Appearance: x / SG: x / pH: x  Gluc: 137 mg/dL / Ketone: x  / Bili: x / Urobili: x   Blood: x / Protein: x / Nitrite: x   Leuk Esterase: x / RBC: x / WBC x   Sq Epi: x / Non Sq Epi: x / Bacteria: x      CAPILLARY BLOOD GLUCOSE          RADIOLOGY & ADDITIONAL TESTS:    Imaging Personally Reviewed:      [ ] Consultant(s) Notes Reviewed  [x] Care Discussed with Consultants/Other Providers:  Ortho PA- plan of care
Post Op Note- POD #0    SUBJECTIVE    82yo Female status post right TKR.  Patient is alert and comfortable  Pain is well controlled.    Denies chest pain/shortness of breath/nausea/vomiting.     OBJECTIVE    T(C): 36.6 (06-05-24 @ 14:35), Max: 36.8 (06-05-24 @ 12:30)  HR: 50 (06-05-24 @ 14:35) (50 - 68)  BP: 148/55 (06-05-24 @ 14:35) (95/72 - 158/60)  RR: 19 (06-05-24 @ 14:35) (12 - 20)  SpO2: 97% (06-05-24 @ 14:35) (97% - 100%)  Wt(kg): --      06-05 @ 07:01  -  06-05 @ 15:39  --------------------------------------------------------  IN: 1000 mL / OUT: 50 mL / NET: 950 mL        PHYSICAL EXAM    Right knee primary dressing C/D/I  Sensation Intact to Light Touch distally  DF/PF/EHL bilaterally intact  Toes warm and pink, capillary refill <2 sec.   Calves soft/nontender bilaterally       ASSESSMENT AND PLAN    - Orthopedically stable  - DVT prophylaxis: PAS + Eliquis 2.5 mg Q12h  - OOB as tolerated with PT/OT  - Pain control as clinically indicated  - Medicine to follow   - Continue antibiotics as per SCIP protocol  - Follow up Labs  - Incentive spirometry  - Discharge home tomorrow pending progress with PT
Post Op Day #1    SUBJECTIVE    82yo Female status post right TKR .   Patient is alert and comfortable.    Pain is controlled with current pain regimen.  Denies nausea, vomiting, chest pain, shortness of breath, abdominal pain or fever.   No new complaints.    OBJECTIVE    Vital Signs Last 24 Hrs  T(C): 36.4 (06 Jun 2024 08:04), Max: 36.8 (05 Jun 2024 12:30)  T(F): 97.6 (06 Jun 2024 08:04), Max: 98.2 (05 Jun 2024 12:30)  HR: 96 (06 Jun 2024 08:04) (50 - 96)  BP: 123/48 (06 Jun 2024 08:04) (95/72 - 151/54)  BP(mean): --  RR: 18 (06 Jun 2024 08:04) (12 - 20)  SpO2: 91% (06 Jun 2024 08:04) (91% - 100%)    Parameters below as of 06 Jun 2024 08:04  Patient On (Oxygen Delivery Method): room air      I&O's Summary    05 Jun 2024 07:01  -  06 Jun 2024 07:00  --------------------------------------------------------  IN: 1100 mL / OUT: 1150 mL / NET: -50 mL        PHYSICAL EXAM    Right knee WILLIAM dressing is clean, dry and intact.   The calf is supple/nontender.   Sensation to light touch is grossly intact distally.   Motor function distally is intact.   No foot drop.   (2+) dorsalis pedis pulse. Capillary refill is less than 2 seconds. No cyanosis.                          9.9<L>  11.26<H> )-----------( 169      ( 06 Jun 2024 06:00 )             29.3<L>  06 Jun 2024 06:00    06 Jun 2024 06:00    137    |  104    |  30<H>  ----------------------------<  137<H>  5.1     |  26     |  1.34<H>    Ca    8.6        06 Jun 2024 06:00        ASSESSMENT AND PLAN  - Orthopedically stable  - DVT prophylaxis: PAS + Eliquis 2.5mg every 12 hours  - Continue physical therapy and occupational therapy  - Weight bearing as tolerated of the right lower extremity with assistance of a walker  - Incentive spirometry encouraged  - Pain control as clinically indicated  - Disposition:  Home today if cleared by medicine and PT/OT

## 2024-06-07 NOTE — PROGRESS NOTE ADULT - ASSESSMENT
83F presented for TKR - right    OA right knee now s/p TKR  Pain Management: acceptable- continue current care Tylenol ATC/Celebrex ATC/ Oxycodone PRN  Continue PT/OT  DVT proph:  high risk - Eliquis  DC plan:  [x  ] Home with        HTN  Restart metoprolol POD #1 with hold parameters  Restart ARB pod #2 with hold parameters  hold diuretic for now.     d/w son at bedside    
83F presented for TKR - right    OA right knee now s/p TKR  Pain Management: acceptable- continue current care Tylenol ATC/Celebrex ATC/ Oxycodone PRN  Continue PT/OT  DVT proph:  high risk - Eliquis  DC plan:  [x  ] Home with        HTN  Restart metoprolol POD #1 with hold parameters  Restart ARB pod #2 with hold parameters  hold diuretic for now.     d/w son at bedside

## 2024-06-12 ENCOUNTER — NON-APPOINTMENT (OUTPATIENT)
Age: 84
End: 2024-06-12

## 2024-06-17 ENCOUNTER — TRANSCRIPTION ENCOUNTER (OUTPATIENT)
Age: 84
End: 2024-06-17

## 2024-06-18 ENCOUNTER — APPOINTMENT (OUTPATIENT)
Dept: ORTHOPEDIC SURGERY | Facility: CLINIC | Age: 84
End: 2024-06-18
Payer: MEDICARE

## 2024-06-18 DIAGNOSIS — Z96.651 PRESENCE OF RIGHT ARTIFICIAL KNEE JOINT: ICD-10-CM

## 2024-06-18 PROBLEM — M17.11 UNILATERAL PRIMARY OSTEOARTHRITIS, RIGHT KNEE: Chronic | Status: ACTIVE | Noted: 2024-05-20

## 2024-06-18 PROCEDURE — 99024 POSTOP FOLLOW-UP VISIT: CPT

## 2024-06-18 PROCEDURE — 73562 X-RAY EXAM OF KNEE 3: CPT | Mod: RT

## 2024-06-18 NOTE — PHYSICAL EXAM
[Right] : right knee [] : ligamentously stable [TWNoteComboBox7] : flexion 95 degrees [de-identified] : extension 0 degrees

## 2024-06-18 NOTE — ASSESSMENT
[FreeTextEntry1] : S/P RT TKA 6/5/24: NO F/C/S. DOING WELL. XRAYS REVIEWED WITH COMPONENTS WELL FIXED. NO SIGNS OF INFECTION. GOOD LIGAMENT BALANCE ON EXAM.  DISCUSSED THE IMPORTANCE OF ELEVATION TO REDUCE SWELLING. PROPER ELEVATION TECHNIQUES DISCUSSED. ABX AND PRECAUTIONS REVIEWED. PT RX. QUESTIONS ANSWERED.

## 2024-06-18 NOTE — HISTORY OF PRESENT ILLNESS
[5] : 5 [Constant] : constant [Sleep] : sleep [Sitting] : sitting [Standing] : standing [Walking] : walking [Stairs] : stairs [de-identified] : 5.7.24 PATIENT HERE FOR RIGHT KNEE PAIN. PAIN STARTED YEARS AGO. PATIENT FELL IN 2021. PATIENT STATES SHES BEEN PUTTING IF OFF. HAS AN MRI FROM 9/2/21([facility])  6.18.24 POST OP #1. DOS: 6.5.24

## 2024-07-10 ENCOUNTER — NON-APPOINTMENT (OUTPATIENT)
Age: 84
End: 2024-07-10

## 2024-07-23 ENCOUNTER — APPOINTMENT (OUTPATIENT)
Dept: ORTHOPEDIC SURGERY | Facility: CLINIC | Age: 84
End: 2024-07-23
Payer: MEDICARE

## 2024-07-23 VITALS — WEIGHT: 135 LBS | HEIGHT: 58 IN | BODY MASS INDEX: 28.34 KG/M2

## 2024-07-23 DIAGNOSIS — Z96.651 PRESENCE OF RIGHT ARTIFICIAL KNEE JOINT: ICD-10-CM

## 2024-07-23 PROCEDURE — 99024 POSTOP FOLLOW-UP VISIT: CPT

## 2024-07-23 NOTE — HISTORY OF PRESENT ILLNESS
[5] : 5 [Constant] : constant [Sleep] : sleep [Sitting] : sitting [Standing] : standing [Walking] : walking [Stairs] : stairs [de-identified] : 5.7.24 PATIENT HERE FOR RIGHT KNEE PAIN. PAIN STARTED YEARS AGO. PATIENT FELL IN 2021. PATIENT STATES SHES BEEN PUTTING IF OFF. HAS AN MRI FROM 9/2/21([facility])  6.18.24 POST OP #1. DOS: 6.5.24 7/23/24: POST OP #2 dos 6/5/24, IMPROVING but still has some pain, physical therapy 2x a week with some relief.

## 2024-07-23 NOTE — PHYSICAL EXAM
[Right] : right knee [] : patient ambulates without assistive device [TWNoteComboBox7] : flexion 125 degrees [de-identified] : extension 0 degrees

## 2024-07-23 NOTE — ASSESSMENT
[FreeTextEntry1] : S/P RT TKA 6/5/24: NO F/C/S. DOING WELL. XRAYS REVIEWED WITH COMPONENTS WELL FIXED. NO SIGNS OF INFECTION. GOOD LIGAMENT BALANCE ON EXAM. ABX AND PRECAUTIONS REVIEWED. WE AGAIN DISCUSSED ABX PPX FOR DENTAL PROCEDURES FOR 2 YEARS FROM SURGERY. PT RX. QUESTIONS ANSWERED.

## 2024-08-19 ENCOUNTER — NON-APPOINTMENT (OUTPATIENT)
Age: 84
End: 2024-08-19

## 2024-10-29 ENCOUNTER — APPOINTMENT (OUTPATIENT)
Dept: ORTHOPEDIC SURGERY | Facility: CLINIC | Age: 84
End: 2024-10-29
Payer: MEDICARE

## 2024-10-29 DIAGNOSIS — Z96.651 PRESENCE OF RIGHT ARTIFICIAL KNEE JOINT: ICD-10-CM

## 2024-10-29 PROCEDURE — G2211 COMPLEX E/M VISIT ADD ON: CPT

## 2024-10-29 PROCEDURE — 99214 OFFICE O/P EST MOD 30 MIN: CPT

## 2024-10-29 PROCEDURE — 73562 X-RAY EXAM OF KNEE 3: CPT | Mod: RT

## 2024-11-18 ENCOUNTER — NON-APPOINTMENT (OUTPATIENT)
Age: 84
End: 2024-11-18

## 2024-11-26 PROBLEM — R92.8 ABNORMAL MAMMOGRAM OF LEFT BREAST: Status: ACTIVE | Noted: 2024-11-26

## 2024-11-26 PROBLEM — R92.8 ABNORMAL ULTRASOUND OF BREAST: Status: ACTIVE | Noted: 2024-11-26

## 2024-12-02 ENCOUNTER — APPOINTMENT (OUTPATIENT)
Dept: SURGERY | Facility: CLINIC | Age: 84
End: 2024-12-02
Payer: MEDICARE

## 2024-12-02 VITALS
OXYGEN SATURATION: 97 % | TEMPERATURE: 98.3 F | SYSTOLIC BLOOD PRESSURE: 136 MMHG | HEART RATE: 79 BPM | RESPIRATION RATE: 14 BRPM | DIASTOLIC BLOOD PRESSURE: 78 MMHG | WEIGHT: 140 LBS | BODY MASS INDEX: 29.39 KG/M2 | HEIGHT: 58 IN

## 2024-12-02 DIAGNOSIS — R92.8 OTHER ABNORMAL AND INCONCLUSIVE FINDINGS ON DIAGNOSTIC IMAGING OF BREAST: ICD-10-CM

## 2024-12-02 DIAGNOSIS — Z12.39 ENCOUNTER FOR OTHER SCREENING FOR MALIGNANT NEOPLASM OF BREAST: ICD-10-CM

## 2024-12-02 PROCEDURE — 99213 OFFICE O/P EST LOW 20 MIN: CPT

## 2024-12-27 ENCOUNTER — APPOINTMENT (OUTPATIENT)
Dept: SURGERY | Facility: CLINIC | Age: 84
End: 2024-12-27
Payer: MEDICARE

## 2024-12-27 VITALS
BODY MASS INDEX: 31.28 KG/M2 | HEIGHT: 57 IN | HEART RATE: 73 BPM | SYSTOLIC BLOOD PRESSURE: 143 MMHG | OXYGEN SATURATION: 97 % | WEIGHT: 145 LBS | DIASTOLIC BLOOD PRESSURE: 77 MMHG

## 2024-12-27 DIAGNOSIS — D05.12 INTRADUCTAL CARCINOMA IN SITU OF LEFT BREAST: ICD-10-CM

## 2024-12-27 PROCEDURE — 99215 OFFICE O/P EST HI 40 MIN: CPT

## 2025-01-07 ENCOUNTER — NON-APPOINTMENT (OUTPATIENT)
Age: 85
End: 2025-01-07

## 2025-01-10 ENCOUNTER — OUTPATIENT (OUTPATIENT)
Dept: OUTPATIENT SERVICES | Facility: HOSPITAL | Age: 85
LOS: 1 days | End: 2025-01-10
Payer: MEDICARE

## 2025-01-10 ENCOUNTER — RESULT REVIEW (OUTPATIENT)
Age: 85
End: 2025-01-10

## 2025-01-10 VITALS
TEMPERATURE: 98 F | OXYGEN SATURATION: 95 % | RESPIRATION RATE: 16 BRPM | HEART RATE: 70 BPM | WEIGHT: 139.99 LBS | SYSTOLIC BLOOD PRESSURE: 160 MMHG | DIASTOLIC BLOOD PRESSURE: 70 MMHG | HEIGHT: 58 IN

## 2025-01-10 DIAGNOSIS — D05.12 INTRADUCTAL CARCINOMA IN SITU OF LEFT BREAST: ICD-10-CM

## 2025-01-10 DIAGNOSIS — Z96.651 PRESENCE OF RIGHT ARTIFICIAL KNEE JOINT: Chronic | ICD-10-CM

## 2025-01-10 DIAGNOSIS — I10 ESSENTIAL (PRIMARY) HYPERTENSION: ICD-10-CM

## 2025-01-10 DIAGNOSIS — Z96.612 PRESENCE OF LEFT ARTIFICIAL SHOULDER JOINT: Chronic | ICD-10-CM

## 2025-01-10 DIAGNOSIS — Z98.890 OTHER SPECIFIED POSTPROCEDURAL STATES: Chronic | ICD-10-CM

## 2025-01-10 DIAGNOSIS — Z90.49 ACQUIRED ABSENCE OF OTHER SPECIFIED PARTS OF DIGESTIVE TRACT: Chronic | ICD-10-CM

## 2025-01-10 DIAGNOSIS — Z90.11 ACQUIRED ABSENCE OF RIGHT BREAST AND NIPPLE: Chronic | ICD-10-CM

## 2025-01-10 DIAGNOSIS — Z90.89 ACQUIRED ABSENCE OF OTHER ORGANS: Chronic | ICD-10-CM

## 2025-01-10 DIAGNOSIS — Z01.818 ENCOUNTER FOR OTHER PREPROCEDURAL EXAMINATION: ICD-10-CM

## 2025-01-10 LAB
ANION GAP SERPL CALC-SCNC: 2 MMOL/L — LOW (ref 5–17)
BUN SERPL-MCNC: 29 MG/DL — HIGH (ref 7–23)
CALCIUM SERPL-MCNC: 9.6 MG/DL — SIGNIFICANT CHANGE UP (ref 8.5–10.1)
CHLORIDE SERPL-SCNC: 109 MMOL/L — HIGH (ref 96–108)
CO2 SERPL-SCNC: 28 MMOL/L — SIGNIFICANT CHANGE UP (ref 22–31)
CREAT SERPL-MCNC: 1.2 MG/DL — SIGNIFICANT CHANGE UP (ref 0.5–1.3)
EGFR: 44 ML/MIN/1.73M2 — LOW
GLUCOSE SERPL-MCNC: 103 MG/DL — HIGH (ref 70–99)
HCT VFR BLD CALC: 39.6 % — SIGNIFICANT CHANGE UP (ref 34.5–45)
HGB BLD-MCNC: 13.7 G/DL — SIGNIFICANT CHANGE UP (ref 11.5–15.5)
MCHC RBC-ENTMCNC: 31.2 PG — SIGNIFICANT CHANGE UP (ref 27–34)
MCHC RBC-ENTMCNC: 34.6 G/DL — SIGNIFICANT CHANGE UP (ref 32–36)
MCV RBC AUTO: 90.2 FL — SIGNIFICANT CHANGE UP (ref 80–100)
NRBC # BLD: 0 /100 WBCS — SIGNIFICANT CHANGE UP (ref 0–0)
PLATELET # BLD AUTO: 266 K/UL — SIGNIFICANT CHANGE UP (ref 150–400)
POTASSIUM SERPL-MCNC: 4 MMOL/L — SIGNIFICANT CHANGE UP (ref 3.5–5.3)
POTASSIUM SERPL-SCNC: 4 MMOL/L — SIGNIFICANT CHANGE UP (ref 3.5–5.3)
RBC # BLD: 4.39 M/UL — SIGNIFICANT CHANGE UP (ref 3.8–5.2)
RBC # FLD: 12.5 % — SIGNIFICANT CHANGE UP (ref 10.3–14.5)
SODIUM SERPL-SCNC: 139 MMOL/L — SIGNIFICANT CHANGE UP (ref 135–145)
WBC # BLD: 7.22 K/UL — SIGNIFICANT CHANGE UP (ref 3.8–10.5)
WBC # FLD AUTO: 7.22 K/UL — SIGNIFICANT CHANGE UP (ref 3.8–10.5)

## 2025-01-10 PROCEDURE — 80048 BASIC METABOLIC PNL TOTAL CA: CPT

## 2025-01-10 PROCEDURE — 93010 ELECTROCARDIOGRAM REPORT: CPT | Mod: 76

## 2025-01-10 PROCEDURE — 93005 ELECTROCARDIOGRAM TRACING: CPT

## 2025-01-10 PROCEDURE — 36415 COLL VENOUS BLD VENIPUNCTURE: CPT

## 2025-01-10 PROCEDURE — 85027 COMPLETE CBC AUTOMATED: CPT

## 2025-01-10 PROCEDURE — 88321 CONSLTJ&REPRT SLD PREP ELSWR: CPT

## 2025-01-10 PROCEDURE — G0463: CPT

## 2025-01-10 NOTE — H&P PST ADULT - NSICDXPASTSURGICALHX_GEN_ALL_CORE_FT
PAST SURGICAL HISTORY:  H/O total knee replacement, right     History of lumpectomy of right breast 2003 - radiation also    S/P appendectomy At Childhood    S/p bilateral shoulder joint replacement     S/P carpal tunnel release Right & Left ( April 2018 )    S/P laparoscopic cholecystectomy     S/P tonsillectomy At Childhood

## 2025-01-10 NOTE — H&P PST ADULT - HISTORY OF PRESENT ILLNESS
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 Pt scheduled for wide excision left breast carcinoma with GREG localization.  *Denies any fever, CP/SOB, or sick contracts

## 2025-01-10 NOTE — H&P PST ADULT - NSICDXPROCEDURE_GEN_ALL_CORE_FT
PROCEDURES:  Wide local excision, lesion, breast, with radar localization using GREG  10-Yordy-2025 15:00:22  Judith Gilbert

## 2025-01-10 NOTE — H&P PST ADULT - NSICDXPASTMEDICALHX_GEN_ALL_CORE_FT
PAST MEDICAL HISTORY:  Breast cancer right 2003    Breast neoplasm, Tis (DCIS), left     Hypertension     Osteoarthritis of right knee     Overactive bladder

## 2025-01-10 NOTE — H&P PST ADULT - PROBLEM SELECTOR PLAN 1
scheduled for wide excision left breast carcinoma with GREG localization on 01/24/25  Labs - CBC, BMP, EKG  Pre op instructions' discussed, including skin prep-verbalized understanding  Pre op medical & cardiology evaluation

## 2025-01-15 ENCOUNTER — OUTPATIENT (OUTPATIENT)
Dept: OUTPATIENT SERVICES | Facility: HOSPITAL | Age: 85
LOS: 1 days | End: 2025-01-15
Payer: MEDICARE

## 2025-01-15 ENCOUNTER — APPOINTMENT (OUTPATIENT)
Dept: ULTRASOUND IMAGING | Facility: CLINIC | Age: 85
End: 2025-01-15

## 2025-01-15 DIAGNOSIS — Z98.890 OTHER SPECIFIED POSTPROCEDURAL STATES: Chronic | ICD-10-CM

## 2025-01-15 DIAGNOSIS — Z90.11 ACQUIRED ABSENCE OF RIGHT BREAST AND NIPPLE: Chronic | ICD-10-CM

## 2025-01-15 DIAGNOSIS — Z90.89 ACQUIRED ABSENCE OF OTHER ORGANS: Chronic | ICD-10-CM

## 2025-01-15 DIAGNOSIS — Z96.612 PRESENCE OF LEFT ARTIFICIAL SHOULDER JOINT: Chronic | ICD-10-CM

## 2025-01-15 DIAGNOSIS — Z90.49 ACQUIRED ABSENCE OF OTHER SPECIFIED PARTS OF DIGESTIVE TRACT: Chronic | ICD-10-CM

## 2025-01-15 DIAGNOSIS — R92.8 OTHER ABNORMAL AND INCONCLUSIVE FINDINGS ON DIAGNOSTIC IMAGING OF BREAST: ICD-10-CM

## 2025-01-15 DIAGNOSIS — Z96.651 PRESENCE OF RIGHT ARTIFICIAL KNEE JOINT: Chronic | ICD-10-CM

## 2025-01-15 PROBLEM — N32.81 OVERACTIVE BLADDER: Chronic | Status: ACTIVE | Noted: 2025-01-10

## 2025-01-15 LAB — SURGICAL PATHOLOGY STUDY: SIGNIFICANT CHANGE UP

## 2025-01-15 PROCEDURE — 19285 PERQ DEV BREAST 1ST US IMAG: CPT

## 2025-01-15 PROCEDURE — 19285 PERQ DEV BREAST 1ST US IMAG: CPT | Mod: LT

## 2025-01-15 PROCEDURE — C1739: CPT

## 2025-01-23 NOTE — ASU PATIENT PROFILE, ADULT - FALL HARM RISK - UNIVERSAL INTERVENTIONS
Bed in lowest position, wheels locked, appropriate side rails in place/Call bell, personal items and telephone in reach/Instruct patient to call for assistance before getting out of bed or chair/Non-slip footwear when patient is out of bed/[place] to call system/Physically safe environment - no spills, clutter or unnecessary equipment/Purposeful Proactive Rounding/Room/bathroom lighting operational, light cord in reach

## 2025-01-23 NOTE — ASU PATIENT PROFILE, ADULT - MEDICATIONS TO TAKE
12:00 Patient called and stated she left work because she did not feel good and is requesting to get her antibiotic today. Informed her that she could get it today. Patient states that she could be here within the hour. as per pre procedure sheet

## 2025-01-24 ENCOUNTER — RESULT REVIEW (OUTPATIENT)
Age: 85
End: 2025-01-24

## 2025-01-24 ENCOUNTER — APPOINTMENT (OUTPATIENT)
Dept: SURGERY | Facility: HOSPITAL | Age: 85
End: 2025-01-24
Payer: MEDICARE

## 2025-01-24 ENCOUNTER — OUTPATIENT (OUTPATIENT)
Dept: OUTPATIENT SERVICES | Facility: HOSPITAL | Age: 85
LOS: 1 days | End: 2025-01-24
Payer: MEDICARE

## 2025-01-24 ENCOUNTER — TRANSCRIPTION ENCOUNTER (OUTPATIENT)
Age: 85
End: 2025-01-24

## 2025-01-24 VITALS
DIASTOLIC BLOOD PRESSURE: 57 MMHG | OXYGEN SATURATION: 100 % | RESPIRATION RATE: 14 BRPM | SYSTOLIC BLOOD PRESSURE: 132 MMHG | HEART RATE: 68 BPM

## 2025-01-24 VITALS
RESPIRATION RATE: 16 BRPM | SYSTOLIC BLOOD PRESSURE: 151 MMHG | OXYGEN SATURATION: 95 % | HEART RATE: 56 BPM | TEMPERATURE: 98 F | DIASTOLIC BLOOD PRESSURE: 68 MMHG | HEIGHT: 58 IN | WEIGHT: 139.99 LBS

## 2025-01-24 DIAGNOSIS — Z96.651 PRESENCE OF RIGHT ARTIFICIAL KNEE JOINT: Chronic | ICD-10-CM

## 2025-01-24 DIAGNOSIS — Z96.612 PRESENCE OF LEFT ARTIFICIAL SHOULDER JOINT: Chronic | ICD-10-CM

## 2025-01-24 DIAGNOSIS — Z90.89 ACQUIRED ABSENCE OF OTHER ORGANS: Chronic | ICD-10-CM

## 2025-01-24 DIAGNOSIS — Z90.49 ACQUIRED ABSENCE OF OTHER SPECIFIED PARTS OF DIGESTIVE TRACT: Chronic | ICD-10-CM

## 2025-01-24 DIAGNOSIS — Z98.890 OTHER SPECIFIED POSTPROCEDURAL STATES: Chronic | ICD-10-CM

## 2025-01-24 DIAGNOSIS — D05.12 INTRADUCTAL CARCINOMA IN SITU OF LEFT BREAST: ICD-10-CM

## 2025-01-24 DIAGNOSIS — Z01.818 ENCOUNTER FOR OTHER PREPROCEDURAL EXAMINATION: ICD-10-CM

## 2025-01-24 DIAGNOSIS — Z90.11 ACQUIRED ABSENCE OF RIGHT BREAST AND NIPPLE: Chronic | ICD-10-CM

## 2025-01-24 PROCEDURE — 88307 TISSUE EXAM BY PATHOLOGIST: CPT | Mod: 26

## 2025-01-24 PROCEDURE — 88307 TISSUE EXAM BY PATHOLOGIST: CPT

## 2025-01-24 PROCEDURE — 88360 TUMOR IMMUNOHISTOCHEM/MANUAL: CPT

## 2025-01-24 PROCEDURE — 14301 TIS TRNFR ANY 30.1-60 SQ CM: CPT

## 2025-01-24 PROCEDURE — 19301 PARTIAL MASTECTOMY: CPT | Mod: LT

## 2025-01-24 PROCEDURE — 76098 X-RAY EXAM SURGICAL SPECIMEN: CPT | Mod: 26

## 2025-01-24 PROCEDURE — 88360 TUMOR IMMUNOHISTOCHEM/MANUAL: CPT | Mod: 26

## 2025-01-24 PROCEDURE — 76098 X-RAY EXAM SURGICAL SPECIMEN: CPT

## 2025-01-24 RX ORDER — IBUPROFEN 600 MG/1
1 TABLET, FILM COATED ORAL
Qty: 20 | Refills: 0
Start: 2025-01-24

## 2025-01-24 RX ORDER — OXYCODONE HYDROCHLORIDE 30 MG/1
5 TABLET ORAL ONCE
Refills: 0 | Status: DISCONTINUED | OUTPATIENT
Start: 2025-01-24 | End: 2025-01-24

## 2025-01-24 RX ORDER — VALSARTAN AND HYDROCHLOROTHIAZIDE 25; 160 MG/1; MG/1
1 TABLET, FILM COATED ORAL
Refills: 0 | DISCHARGE

## 2025-01-24 RX ORDER — SODIUM CHLORIDE 9 G/ML
1000 INJECTION, SOLUTION INTRAVENOUS
Refills: 0 | Status: DISCONTINUED | OUTPATIENT
Start: 2025-01-24 | End: 2025-01-24

## 2025-01-24 RX ORDER — CEFAZOLIN SODIUM IN 0.9 % NACL 2 G/10 ML
1000 SYRINGE (ML) INTRAVENOUS ONCE
Refills: 0 | Status: COMPLETED | OUTPATIENT
Start: 2025-01-24 | End: 2025-01-24

## 2025-01-24 RX ORDER — VALSARTAN AND HYDROCHLOROTHIAZIDE 25; 160 MG/1; MG/1
0 TABLET, FILM COATED ORAL
Refills: 0 | DISCHARGE

## 2025-01-24 RX ORDER — ACETAMINOPHEN 160 MG/5ML
2 SUSPENSION ORAL
Qty: 20 | Refills: 0
Start: 2025-01-24

## 2025-01-24 RX ORDER — HYDROMORPHONE HYDROCHLORIDE 4 MG/ML
0.5 INJECTION, SOLUTION INTRAMUSCULAR; INTRAVENOUS; SUBCUTANEOUS
Refills: 0 | Status: DISCONTINUED | OUTPATIENT
Start: 2025-01-24 | End: 2025-01-24

## 2025-01-24 RX ORDER — ONDANSETRON 4 MG/1
4 TABLET, ORALLY DISINTEGRATING ORAL ONCE
Refills: 0 | Status: DISCONTINUED | OUTPATIENT
Start: 2025-01-24 | End: 2025-01-24

## 2025-01-24 RX ORDER — MIRABEGRON 50 MG/1
1 TABLET, FILM COATED, EXTENDED RELEASE ORAL
Refills: 0 | DISCHARGE

## 2025-01-24 RX ORDER — OXYCODONE HYDROCHLORIDE 30 MG/1
1 TABLET ORAL
Qty: 8 | Refills: 0
Start: 2025-01-24

## 2025-01-24 RX ADMIN — SODIUM CHLORIDE 75 MILLILITER(S): 9 INJECTION, SOLUTION INTRAVENOUS at 11:17

## 2025-01-24 NOTE — ASU DISCHARGE PLAN (ADULT/PEDIATRIC) - CARE PROVIDER_API CALL
Chevy Linares  Surgery  [address] [place] [address]  Phone: [phone]  Fax: [phone]  Follow Up Time:

## 2025-01-24 NOTE — ASU DISCHARGE PLAN (ADULT/PEDIATRIC) - FINANCIAL ASSISTANCE
[facility] provides services at a reduced cost to those who are determined to be eligible through [facility]’s financial assistance program. Information regarding [facility]’s financial assistance program can be found by going to [email or web address] or by calling [phone].

## 2025-01-24 NOTE — ASU DISCHARGE PLAN (ADULT/PEDIATRIC) - NS MD DC FALL RISK RISK
For information on Fall & Injury Prevention, visit: [email or web address] OR  [email or web address] OR  https://www.cdc.gov/steadi/patient.html

## 2025-01-24 NOTE — ASU DISCHARGE PLAN (ADULT/PEDIATRIC) - ASU DC SPECIAL INSTRUCTIONSFT
Keep the dressings on the left side of your chest clean, dry, and in place.  Do not get them wet.     You may resume your usual daily diet as tolerated.      No strenuous physical activity, no heavy lifting (nothing heavier than 5 lbs.), no exercise.     Do not drive for 24 hours after surgery.  Do not drive while taking narcotic pain medication.  Do not drive until pain-free.      -Take oxycodone as prescribed for severe pain.      -Take Milk of Magnesia (30cc twice a day) while taking narcotic pain medications to prevent constipation.    -For moderate pain , you may take either of the following two:     -Ibuprofen 800 mg as prescribed (take with food)     -Tylenol Extra-Strength 500mg as prescribed      ***CAUTION with the use of Norco AND Tylenol - both contain acetaminophen - do not exceed 4000mg/day of acetaminophen.    Follow-up with Dr. Linares in his office as discussed during your pre-operative office consultation.

## 2025-01-29 PROBLEM — D05.12 INTRADUCTAL CARCINOMA IN SITU OF LEFT BREAST: Chronic | Status: ACTIVE | Noted: 2025-01-10

## 2025-01-29 LAB — SURGICAL PATHOLOGY STUDY: SIGNIFICANT CHANGE UP

## 2025-01-30 ENCOUNTER — NON-APPOINTMENT (OUTPATIENT)
Age: 85
End: 2025-01-30

## 2025-02-03 ENCOUNTER — APPOINTMENT (OUTPATIENT)
Dept: SURGERY | Facility: CLINIC | Age: 85
End: 2025-02-03
Payer: MEDICARE

## 2025-02-03 DIAGNOSIS — D05.12 INTRADUCTAL CARCINOMA IN SITU OF LEFT BREAST: ICD-10-CM

## 2025-02-03 DIAGNOSIS — Z85.3 PERSONAL HISTORY OF MALIGNANT NEOPLASM OF BREAST: ICD-10-CM

## 2025-02-03 DIAGNOSIS — C50.911 MALIGNANT NEOPLASM OF UNSPECIFIED SITE OF RIGHT FEMALE BREAST: ICD-10-CM

## 2025-02-03 PROCEDURE — 99024 POSTOP FOLLOW-UP VISIT: CPT

## 2025-05-31 ENCOUNTER — NON-APPOINTMENT (OUTPATIENT)
Age: 85
End: 2025-05-31

## 2025-06-02 ENCOUNTER — APPOINTMENT (OUTPATIENT)
Dept: SURGERY | Facility: CLINIC | Age: 85
End: 2025-06-02
Payer: MEDICARE

## 2025-06-02 VITALS
SYSTOLIC BLOOD PRESSURE: 170 MMHG | HEIGHT: 57 IN | BODY MASS INDEX: 30.2 KG/M2 | DIASTOLIC BLOOD PRESSURE: 84 MMHG | WEIGHT: 140 LBS | OXYGEN SATURATION: 97 % | HEART RATE: 90 BPM

## 2025-06-02 DIAGNOSIS — Z12.39 ENCOUNTER FOR OTHER SCREENING FOR MALIGNANT NEOPLASM OF BREAST: ICD-10-CM

## 2025-06-02 DIAGNOSIS — C50.911 MALIGNANT NEOPLASM OF UNSPECIFIED SITE OF RIGHT FEMALE BREAST: ICD-10-CM

## 2025-06-02 DIAGNOSIS — D05.12 INTRADUCTAL CARCINOMA IN SITU OF LEFT BREAST: ICD-10-CM

## 2025-06-02 DIAGNOSIS — N60.12 DIFFUSE CYSTIC MASTOPATHY OF RIGHT BREAST: ICD-10-CM

## 2025-06-02 DIAGNOSIS — N60.11 DIFFUSE CYSTIC MASTOPATHY OF RIGHT BREAST: ICD-10-CM

## 2025-06-02 DIAGNOSIS — Z85.3 PERSONAL HISTORY OF MALIGNANT NEOPLASM OF BREAST: ICD-10-CM

## 2025-06-02 PROCEDURE — 99215 OFFICE O/P EST HI 40 MIN: CPT

## (undated) DEVICE — ELCTR BOVIE TIP BLADE INSULATED 2.75" EDGE

## (undated) DEVICE — SOL IRR BAG NS 0.9% 3000ML

## (undated) DEVICE — HOOD FLYTE STRYKER HELMET SHIELD

## (undated) DEVICE — MAKO VIZADISC KNEE TRACKING KIT

## (undated) DEVICE — ZIMMER PULSAVAC PLUS FAN KIT

## (undated) DEVICE — HOOD T5 PEELAWAY

## (undated) DEVICE — GLV 8 PROTEXIS (WHITE)

## (undated) DEVICE — WARMING BLANKET UPPER ADULT

## (undated) DEVICE — MAKO DRAPE KIT

## (undated) DEVICE — DRSG STOCKINETTE TUBULAR COTTON 2PLY 6X72"

## (undated) DEVICE — VENODYNE/SCD SLEEVE CALF MEDIUM

## (undated) DEVICE — SHEATH SURG GUIDE SCOUT DISP STRL

## (undated) DEVICE — PLV-SCD MACHINE: Type: DURABLE MEDICAL EQUIPMENT

## (undated) DEVICE — DRAPE TOWEL BLUE 17" X 24"

## (undated) DEVICE — SUT VICRYL 2-0 36" CT-1 UNDYED

## (undated) DEVICE — GLV 8 PROTEXIS (BLUE)

## (undated) DEVICE — SUT VICRYL 1 36" CTX UNDYED

## (undated) DEVICE — SUCTION YANKAUER NO CONTROL VENT

## (undated) DEVICE — SOL IRR POUR NS 0.9% 1000ML

## (undated) DEVICE — GLV 7.5 PROTEXIS (WHITE)

## (undated) DEVICE — DRSG STOCKINETTE TUBULAR COTTON 1PLY 6X72"

## (undated) DEVICE — DRAPE IOBAN 33" X 23"

## (undated) DEVICE — DRAPE 3/4 SHEET 52X76"

## (undated) DEVICE — DRAPE INSTRUMENT POUCH 6.75" X 11"

## (undated) DEVICE — SUT POLYSORB 4-0 18IN P12 UD

## (undated) DEVICE — SOL IRR POUR H2O 1000ML

## (undated) DEVICE — ONETRAC LIGHTED RETRACTOR LXS CROWN TIP DISP

## (undated) DEVICE — TUBING FOR SMOKE EVACUATOR (PURPLE END)

## (undated) DEVICE — PACK TOTAL KNEE

## (undated) DEVICE — SPECIMEN BOARD FOR BREAST SPECIMEN

## (undated) DEVICE — WARMING BLANKET LOWER ADULT

## (undated) DEVICE — MAKO BLADE STANDARD

## (undated) DEVICE — DRSG STERISTRIPS 0.5 X 4"

## (undated) DEVICE — MAKO BLADE NARROW

## (undated) DEVICE — SYR LUER LOK 20CC

## (undated) DEVICE — PACK MINOR WITH LAP

## (undated) DEVICE — VENODYNE/SCD SLEEVE CALF LARGE

## (undated) DEVICE — NDL HYPO SAFE 20G X 1.5" (YELLOW)

## (undated) DEVICE — ELCTR STRYKER NEPTUNE SMOKE EVACUATION PENCIL (GREEN)

## (undated) DEVICE — SAW BLADE STRYKER WIDE MED 25MM X 73MM X 0.89MM

## (undated) DEVICE — DRAPE 1/2 SHEET 40X57"

## (undated) DEVICE — SUT SOFSILK 2-0 18" C-15

## (undated) DEVICE — CRYO/CUFF GRAVITY COOLER KNEE LARGE

## (undated) DEVICE — DRAPE 3/4 SHEET W REINFORCEMENT 56X77"

## (undated) DEVICE — SUT POLYSORB 3-0 30" V-20 UNDYED

## (undated) DEVICE — PREP CHLORAPREP HI-LITE ORANGE 26ML

## (undated) DEVICE — DRAPE STERI-DRAPE INCISE 32X33"

## (undated) DEVICE — BLADE SCALPEL SAFETYLOCK #15

## (undated) DEVICE — SUT VLOC 90 4-0 18" P-12 UNDYED

## (undated) DEVICE — DRSG PICO NPWT 4X12"